# Patient Record
Sex: MALE | Race: WHITE | Employment: OTHER
[De-identification: names, ages, dates, MRNs, and addresses within clinical notes are randomized per-mention and may not be internally consistent; named-entity substitution may affect disease eponyms.]

---

## 2017-01-04 ENCOUNTER — OFFICE VISIT (OUTPATIENT)
Dept: NEUROLOGY | Facility: CLINIC | Age: 67
End: 2017-01-04

## 2017-01-04 VITALS
HEART RATE: 72 BPM | HEIGHT: 67 IN | SYSTOLIC BLOOD PRESSURE: 152 MMHG | DIASTOLIC BLOOD PRESSURE: 86 MMHG | BODY MASS INDEX: 31.39 KG/M2 | WEIGHT: 200 LBS

## 2017-01-04 DIAGNOSIS — D32.9 MENINGIOMA (HCC): ICD-10-CM

## 2017-01-04 DIAGNOSIS — G93.9 TEMPORAL LOBE LESION: ICD-10-CM

## 2017-01-04 DIAGNOSIS — G40.909 SEIZURE DISORDER (HCC): Primary | ICD-10-CM

## 2017-01-04 PROCEDURE — 99214 OFFICE O/P EST MOD 30 MIN: CPT | Performed by: PSYCHIATRY & NEUROLOGY

## 2017-01-10 ENCOUNTER — TELEPHONE (OUTPATIENT)
Dept: NEUROLOGY | Facility: CLINIC | Age: 67
End: 2017-01-10

## 2017-01-10 DIAGNOSIS — G40.909 SEIZURE DISORDER (HCC): ICD-10-CM

## 2017-01-10 DIAGNOSIS — G40.909 SEIZURE DISORDER (HCC): Primary | ICD-10-CM

## 2017-01-23 DIAGNOSIS — G40.909 SEIZURE DISORDER (HCC): ICD-10-CM

## 2017-01-24 ENCOUNTER — TELEPHONE (OUTPATIENT)
Dept: NEUROLOGY | Facility: CLINIC | Age: 67
End: 2017-01-24

## 2017-01-24 DIAGNOSIS — G40.909 SEIZURE DISORDER (HCC): Primary | ICD-10-CM

## 2017-02-02 DIAGNOSIS — G40.909 SEIZURE DISORDER (HCC): ICD-10-CM

## 2017-02-10 ENCOUNTER — TELEPHONE (OUTPATIENT)
Dept: NEUROLOGY | Facility: CLINIC | Age: 67
End: 2017-02-10

## 2017-02-10 DIAGNOSIS — G40.909 SEIZURE DISORDER (HCC): Primary | ICD-10-CM

## 2017-02-23 DIAGNOSIS — G40.909 SEIZURE DISORDER (HCC): ICD-10-CM

## 2017-03-30 ENCOUNTER — TELEPHONE (OUTPATIENT)
Dept: NEUROLOGY | Age: 67
End: 2017-03-30

## 2017-04-11 ENCOUNTER — TELEPHONE (OUTPATIENT)
Dept: NEUROLOGY | Age: 67
End: 2017-04-11

## 2017-05-02 RX ORDER — PHENYTOIN SODIUM 100 MG/1
CAPSULE, EXTENDED RELEASE ORAL
Qty: 150 CAPSULE | Refills: 4 | Status: SHIPPED | OUTPATIENT
Start: 2017-05-02 | End: 2017-07-07 | Stop reason: SDUPTHER

## 2017-05-02 RX ORDER — PHENYTOIN SODIUM 100 MG/1
200 CAPSULE, EXTENDED RELEASE ORAL 2 TIMES DAILY
Qty: 120 CAPSULE | Refills: 1 | Status: SHIPPED | OUTPATIENT
Start: 2017-05-02 | End: 2017-05-02 | Stop reason: SDUPTHER

## 2017-05-23 ENCOUNTER — TELEPHONE (OUTPATIENT)
Dept: NEUROLOGY | Age: 67
End: 2017-05-23

## 2017-05-23 DIAGNOSIS — G93.9 TEMPORAL LOBE LESION: Primary | ICD-10-CM

## 2017-05-23 RX ORDER — ALPRAZOLAM 0.5 MG/1
TABLET ORAL
Qty: 2 TABLET | Refills: 0 | OUTPATIENT
Start: 2017-05-23 | End: 2017-06-08 | Stop reason: SDUPTHER

## 2017-05-26 DIAGNOSIS — R56.9 SEIZURE (HCC): Primary | ICD-10-CM

## 2017-06-08 ENCOUNTER — TELEPHONE (OUTPATIENT)
Dept: NEUROLOGY | Age: 67
End: 2017-06-08

## 2017-06-08 ENCOUNTER — HOSPITAL ENCOUNTER (OUTPATIENT)
Dept: MRI IMAGING | Age: 67
Discharge: HOME | End: 2017-06-08
Payer: MEDICARE

## 2017-06-08 ENCOUNTER — HOSPITAL ENCOUNTER (OUTPATIENT)
Age: 67
Discharge: HOME | End: 2017-06-08
Payer: MEDICARE

## 2017-06-08 DIAGNOSIS — G93.9 TEMPORAL LOBE LESION: ICD-10-CM

## 2017-06-08 DIAGNOSIS — R56.9 SEIZURE (HCC): ICD-10-CM

## 2017-06-08 LAB
BUN BLDV-MCNC: 20 MG/DL (ref 8–23)
CREAT SERPL-MCNC: 0.71 MG/DL (ref 0.7–1.2)
GFR AFRICAN AMERICAN: >60 ML/MIN
GFR NON-AFRICAN AMERICAN: >60 ML/MIN
GFR SERPL CREATININE-BSD FRML MDRD: NORMAL ML/MIN/{1.73_M2}
GFR SERPL CREATININE-BSD FRML MDRD: NORMAL ML/MIN/{1.73_M2}

## 2017-06-08 PROCEDURE — 36415 COLL VENOUS BLD VENIPUNCTURE: CPT

## 2017-06-08 PROCEDURE — 84520 ASSAY OF UREA NITROGEN: CPT

## 2017-06-08 PROCEDURE — 82565 ASSAY OF CREATININE: CPT

## 2017-06-08 RX ORDER — ALPRAZOLAM 0.5 MG/1
TABLET ORAL
Qty: 2 TABLET | Refills: 0 | OUTPATIENT
Start: 2017-06-08 | End: 2017-12-26 | Stop reason: SDUPTHER

## 2017-06-13 ENCOUNTER — HOSPITAL ENCOUNTER (OUTPATIENT)
Dept: MRI IMAGING | Age: 67
Discharge: HOME | End: 2017-06-13
Payer: MEDICARE

## 2017-06-13 DIAGNOSIS — G93.9 TEMPORAL LOBE LESION: ICD-10-CM

## 2017-06-13 PROCEDURE — A9579 GAD-BASE MR CONTRAST NOS,1ML: HCPCS | Performed by: PSYCHIATRY & NEUROLOGY

## 2017-06-13 PROCEDURE — 70553 MRI BRAIN STEM W/O & W/DYE: CPT

## 2017-06-13 PROCEDURE — 6360000004 HC RX CONTRAST MEDICATION: Performed by: PSYCHIATRY & NEUROLOGY

## 2017-06-13 PROCEDURE — 2580000003 HC RX 258: Performed by: PSYCHIATRY & NEUROLOGY

## 2017-06-13 RX ORDER — SODIUM CHLORIDE 0.9 % (FLUSH) 0.9 %
10 SYRINGE (ML) INJECTION PRN
Status: DISCONTINUED | OUTPATIENT
Start: 2017-06-13 | End: 2017-06-16 | Stop reason: HOSPADM

## 2017-06-13 RX ADMIN — Medication 10 ML: at 07:27

## 2017-06-13 RX ADMIN — GADOPENTETATE DIMEGLUMINE 19 ML: 469.01 INJECTION INTRAVENOUS at 07:27

## 2017-06-29 RX ORDER — TRAZODONE HYDROCHLORIDE 50 MG/1
TABLET ORAL
Qty: 60 TABLET | Refills: 0 | Status: SHIPPED | OUTPATIENT
Start: 2017-06-29 | End: 2017-07-29

## 2017-07-07 RX ORDER — PHENYTOIN SODIUM 100 MG/1
CAPSULE, EXTENDED RELEASE ORAL
Qty: 150 CAPSULE | Refills: 4 | OUTPATIENT
Start: 2017-07-07 | End: 2017-08-16 | Stop reason: SDUPTHER

## 2017-08-16 ENCOUNTER — OFFICE VISIT (OUTPATIENT)
Dept: NEUROLOGY | Age: 67
End: 2017-08-16
Payer: MEDICARE

## 2017-08-16 VITALS
BODY MASS INDEX: 30.13 KG/M2 | DIASTOLIC BLOOD PRESSURE: 86 MMHG | HEART RATE: 70 BPM | SYSTOLIC BLOOD PRESSURE: 124 MMHG | HEIGHT: 67 IN | WEIGHT: 192 LBS

## 2017-08-16 DIAGNOSIS — G93.9 TEMPORAL LOBE LESION: ICD-10-CM

## 2017-08-16 DIAGNOSIS — R56.9 SEIZURE (HCC): Primary | ICD-10-CM

## 2017-08-16 PROCEDURE — G8417 CALC BMI ABV UP PARAM F/U: HCPCS | Performed by: PSYCHIATRY & NEUROLOGY

## 2017-08-16 PROCEDURE — 3017F COLORECTAL CA SCREEN DOC REV: CPT | Performed by: PSYCHIATRY & NEUROLOGY

## 2017-08-16 PROCEDURE — 1123F ACP DISCUSS/DSCN MKR DOCD: CPT | Performed by: PSYCHIATRY & NEUROLOGY

## 2017-08-16 PROCEDURE — 4040F PNEUMOC VAC/ADMIN/RCVD: CPT | Performed by: PSYCHIATRY & NEUROLOGY

## 2017-08-16 PROCEDURE — G8427 DOCREV CUR MEDS BY ELIG CLIN: HCPCS | Performed by: PSYCHIATRY & NEUROLOGY

## 2017-08-16 PROCEDURE — 1036F TOBACCO NON-USER: CPT | Performed by: PSYCHIATRY & NEUROLOGY

## 2017-08-16 PROCEDURE — 99214 OFFICE O/P EST MOD 30 MIN: CPT | Performed by: PSYCHIATRY & NEUROLOGY

## 2017-08-16 RX ORDER — PHENYTOIN SODIUM 100 MG/1
CAPSULE, EXTENDED RELEASE ORAL
Qty: 120 CAPSULE | Refills: 11 | Status: SHIPPED | OUTPATIENT
Start: 2017-08-16 | End: 2017-10-06 | Stop reason: SDUPTHER

## 2017-08-18 ENCOUNTER — TELEPHONE (OUTPATIENT)
Dept: NEUROLOGY | Age: 67
End: 2017-08-18

## 2017-08-22 DIAGNOSIS — R56.9 SEIZURE (HCC): ICD-10-CM

## 2017-10-06 RX ORDER — PHENYTOIN SODIUM 100 MG/1
CAPSULE, EXTENDED RELEASE ORAL
Qty: 120 CAPSULE | Refills: 10 | OUTPATIENT
Start: 2017-10-06 | End: 2018-09-04 | Stop reason: SDUPTHER

## 2017-12-14 ENCOUNTER — TELEPHONE (OUTPATIENT)
Dept: NEUROLOGY | Age: 67
End: 2017-12-14

## 2017-12-15 ENCOUNTER — HOSPITAL ENCOUNTER (EMERGENCY)
Age: 67
Discharge: HOME | End: 2017-12-15
Attending: EMERGENCY MEDICINE
Payer: MEDICARE

## 2017-12-15 ENCOUNTER — APPOINTMENT (OUTPATIENT)
Dept: GENERAL RADIOLOGY | Age: 67
End: 2017-12-15
Payer: MEDICARE

## 2017-12-15 VITALS
SYSTOLIC BLOOD PRESSURE: 122 MMHG | HEART RATE: 85 BPM | HEIGHT: 67 IN | RESPIRATION RATE: 18 BRPM | DIASTOLIC BLOOD PRESSURE: 78 MMHG | TEMPERATURE: 97.5 F | BODY MASS INDEX: 29.03 KG/M2 | OXYGEN SATURATION: 98 % | WEIGHT: 185 LBS

## 2017-12-15 DIAGNOSIS — M25.511 RIGHT SHOULDER PAIN, UNSPECIFIED CHRONICITY: ICD-10-CM

## 2017-12-15 DIAGNOSIS — R56.9 SEIZURE (HCC): Primary | ICD-10-CM

## 2017-12-15 LAB
ABSOLUTE EOS #: 0.05 K/UL (ref 0–0.44)
ABSOLUTE IMMATURE GRANULOCYTE: 0.03 K/UL (ref 0–0.3)
ABSOLUTE LYMPH #: 0.92 K/UL (ref 1.1–3.7)
ABSOLUTE MONO #: 0.7 K/UL (ref 0.1–1.2)
ANION GAP SERPL CALCULATED.3IONS-SCNC: 16 MMOL/L (ref 9–17)
BASOPHILS # BLD: 0 % (ref 0–2)
BASOPHILS ABSOLUTE: <0.03 K/UL (ref 0–0.2)
BUN BLDV-MCNC: 22 MG/DL (ref 8–23)
BUN/CREAT BLD: ABNORMAL (ref 9–20)
CALCIUM SERPL-MCNC: 9.7 MG/DL (ref 8.6–10.4)
CHLORIDE BLD-SCNC: 104 MMOL/L (ref 98–107)
CO2: 23 MMOL/L (ref 20–31)
CREAT SERPL-MCNC: 0.66 MG/DL (ref 0.7–1.2)
DIFFERENTIAL TYPE: ABNORMAL
EOSINOPHILS RELATIVE PERCENT: 1 % (ref 1–4)
GFR AFRICAN AMERICAN: >60 ML/MIN
GFR NON-AFRICAN AMERICAN: >60 ML/MIN
GFR SERPL CREATININE-BSD FRML MDRD: ABNORMAL ML/MIN/{1.73_M2}
GFR SERPL CREATININE-BSD FRML MDRD: ABNORMAL ML/MIN/{1.73_M2}
GLUCOSE BLD-MCNC: 155 MG/DL (ref 70–99)
HCT VFR BLD CALC: 43.1 % (ref 40.7–50.3)
HEMOGLOBIN: 14.8 G/DL (ref 13–17)
IMMATURE GRANULOCYTES: 0 %
LYMPHOCYTES # BLD: 14 % (ref 24–43)
MCH RBC QN AUTO: 30.6 PG (ref 25.2–33.5)
MCHC RBC AUTO-ENTMCNC: 34.3 G/DL (ref 28.4–34.8)
MCV RBC AUTO: 89.2 FL (ref 82.6–102.9)
MONOCYTES # BLD: 11 % (ref 3–12)
PDW BLD-RTO: 12.1 % (ref 11.8–14.4)
PHENYTOIN DATE LAST DOSE: ABNORMAL
PHENYTOIN DOSE AMOUNT: ABNORMAL
PHENYTOIN DOSE TIME: ABNORMAL
PHENYTOIN LEVEL: 8.2 UG/ML (ref 10–20)
PLATELET # BLD: 254 K/UL (ref 138–453)
PLATELET ESTIMATE: ABNORMAL
PMV BLD AUTO: 9.7 FL (ref 8.1–13.5)
POTASSIUM SERPL-SCNC: 4.2 MMOL/L (ref 3.7–5.3)
RBC # BLD: 4.83 M/UL (ref 4.21–5.77)
RBC # BLD: ABNORMAL 10*6/UL
SEG NEUTROPHILS: 74 % (ref 36–65)
SEGMENTED NEUTROPHILS ABSOLUTE COUNT: 4.96 K/UL (ref 1.5–8.1)
SODIUM BLD-SCNC: 143 MMOL/L (ref 135–144)
WBC # BLD: 6.7 K/UL (ref 3.5–11.3)
WBC # BLD: ABNORMAL 10*3/UL

## 2017-12-15 PROCEDURE — 73030 X-RAY EXAM OF SHOULDER: CPT

## 2017-12-15 PROCEDURE — 80185 ASSAY OF PHENYTOIN TOTAL: CPT

## 2017-12-15 PROCEDURE — 85025 COMPLETE CBC W/AUTO DIFF WBC: CPT

## 2017-12-15 PROCEDURE — 99284 EMERGENCY DEPT VISIT MOD MDM: CPT

## 2017-12-15 PROCEDURE — 80048 BASIC METABOLIC PNL TOTAL CA: CPT

## 2017-12-15 PROCEDURE — 6370000000 HC RX 637 (ALT 250 FOR IP): Performed by: EMERGENCY MEDICINE

## 2017-12-15 RX ORDER — PHENYTOIN SODIUM 100 MG/1
400 CAPSULE, EXTENDED RELEASE ORAL ONCE
Status: COMPLETED | OUTPATIENT
Start: 2017-12-15 | End: 2017-12-15

## 2017-12-15 RX ADMIN — EXTENDED PHENYTOIN SODIUM 400 MG: 100 CAPSULE ORAL at 12:15

## 2017-12-15 NOTE — TELEPHONE ENCOUNTER
Heber Dorys Mcintosh went to the ER this morning. He was evaluated and released. They did do some lab work and the Phenytoin level was low at 8.2. This was discussed with Dr. Janes Valverde. He would like to add Keppra 500 mg BID and keep the Dilantin 200 mg BID. Dr. Janes Valverde also restricted Mr. Dorys Mcintosh from driving for 3 months. Call was placed back to Mr. Dorys Mcintosh this afternoon and this was discussed. Patient wrote down the name of the new Rx. When I told him that he was restricted from driving for 3 months he stated \"3 months is a long time\". I asked him to call if there were any problems.

## 2017-12-15 NOTE — ED PROVIDER NOTES
[facility]  ED  Emergency Department Encounter  Emergency Medicine Resident     Pt Name: Maria Alejandra Flores  MRN: [id number]  [place] 1950  Date of evaluation: 12/15/17  PCP:  Matt Cates       Chief Complaint   Patient presents with    Shoulder Pain     Pt to ED with c/o possible seizure on Tuesday, pt woke up wednesday with sever bruising to right shoulder and unsure how he got it, pt also bit the side of his tongue with no memory of it, pt has hx of seizures, takes dilantin, missed 1 dose this week    Seizures       HISTORY OF PRESENT ILLNESS  (Location/Symptom, Timing/Onset, Context/Setting, Quality, Duration, Modifying Factors, Severity.)      Maria Alejandra Flores is a 79 y.o. male who has a known seizure disorder on Fosphenytoin presents acutely after a seizure like episode. Not witnessed. Stated that it was generalized tonic clonic  And it was unwitnessed and it took about twenty minutes to regain full consciousness. + loss of bowel or bladder function and biting tongue. Was compliant with his medications but missed two doses  medications . Neurologist is Neri Alba which he has temporal lobe lesions. Mild generalized headache that is dull in nature consistent with normal post seizure headaches on wednesday but denies any trauma to the extremities except to the right shoulder of which he complains of pain. Symptoms are moderate to severe in severity no other aggravating or relieving factors and improving in nature. Denied any fevers, chills, change in urination, change in stools, productive cough, nausea, emesis, diarrhea, drug abuse or alcohol abuse, blood thinnersexcept is on clopidogrel/asa. No changes in hearing changes or tinnitus, blurry or double vision. PAST MEDICAL / SURGICAL / SOCIAL / FAMILY HISTORY      has a past medical history of Cataracts, bilateral; Coronary artery disease; Heart attack; Hyperlipidemia; Hypertension; and Seizures ([facility] [address]      has a past Insert peripheral IV       MEDICATIONS ORDERED:  Orders Placed This Encounter   Medications    DISCONTD: phenytoin (DILANTIN) 420 mg in sodium chloride 0.9 % 100 mL IVPB (loading dose)    phenytoin (DILANTIN) ER capsule 400 mg       DDX:  Primary, cva, avm, hypertensive encephalopathy, intraparenchymal hemorrhage, sah, sdh, edh, meningitis, ecncepahlitis, brain abscess, hiv encephalopathy, febrile, neurosyphilis, etoh/sedative withdrawal, ingestion (cocaine,  Pcp, amphetamines, tca, lidocaine, inh), change in glucose, change in sodium, hypocalcemia, hypomagnesemia, hypoxia, uremia, hepatic failure, thyrotoxicosis, Reye, cmv,      DIAGNOSTIC RESULTS / EMERGENCY DEPARTMENT COURSE / MDM     LABS:  Results for orders placed or performed during the hospital encounter of 12/15/17   CBC WITH AUTO DIFFERENTIAL   Result Value Ref Range    WBC 6.7 3.5 - 11.3 k/uL    RBC 4.83 4.21 - 5.77 m/uL    Hemoglobin 14.8 13.0 - 17.0 g/dL    Hematocrit 43.1 40.7 - 50.3 %    MCV 89.2 82.6 - 102.9 fL    MCH 30.6 25.2 - 33.5 pg    MCHC 34.3 28.4 - 34.8 g/dL    RDW 12.1 11.8 - 14.4 %    Platelets 857 013 - 826 k/uL    MPV 9.7 8.1 - 13.5 fL    Differential Type NOT REPORTED     Seg Neutrophils 74 (H) 36 - 65 %    Lymphocytes 14 (L) 24 - 43 %    Monocytes 11 3 - 12 %    Eosinophils % 1 1 - 4 %    Basophils 0 0 - 2 %    Immature Granulocytes 0 0 %    Segs Absolute 4.96 1.50 - 8.10 k/uL    Absolute Lymph # 0.92 (L) 1.10 - 3.70 k/uL    Absolute Mono # 0.70 0.10 - 1.20 k/uL    Absolute Eos # 0.05 0.00 - 0.44 k/uL    Basophils # <0.03 0.00 - 0.20 k/uL    Absolute Immature Granulocyte 0.03 0.00 - 0.30 k/uL    WBC Morphology NOT REPORTED     RBC Morphology NOT REPORTED     Platelet Estimate NOT REPORTED    BASIC METABOLIC PANEL   Result Value Ref Range    Glucose 155 (H) 70 - 99 mg/dL    BUN 22 8 - 23 mg/dL    CREATININE 0.66 (L) 0.70 - 1.20 mg/dL    Bun/Cre Ratio NOT REPORTED 9 - 20    Calcium 9.7 8.6 - 10.4 mg/dL    Sodium 143 135 - 144 mmol/L Potassium 4.2 3.7 - 5.3 mmol/L    Chloride 104 98 - 107 mmol/L    CO2 23 20 - 31 mmol/L    Anion Gap 16 9 - 17 mmol/L    GFR Non-African American >60 >60 mL/min    GFR African American >60 >60 mL/min    GFR Comment          GFR Staging NOT REPORTED    PHENYTOIN LEVEL, TOTAL   Result Value Ref Range    Phenytoin Lvl 8.2 (L) 10.0 - 20.0 ug/mL    Phenytoin Dose Amount NOT REPORTED     Phenytoin Date Last Dose NOT REPORTED     Phenytoin Dose Time NOT REPORTED        IMPRESSION: Patient afebrile, non hypertensive,  neurologically intact atraumatic appearing with a known seizure disorder, presenting with symptoms typical of normal seizure. Plan is seizure precautions, seizure medications, reassess and follow up with neurologist I have low clinical suspicion for a EPH/SDH/Intracranial Mass, Shoulder Dislocation, Hypertensive Encephalopathy, Withdrawal or Electrolyte abnormalities at this time    RADIOLOGY:  Xr Shoulder Right (min 2 Views)    Result Date: 12/15/2017  EXAMINATION: 3 VIEWS OF THE RIGHT SHOULDER 12/15/2017 9:44 am COMPARISON: None. HISTORY: ORDERING SYSTEM PROVIDED HISTORY: shoulder pain post seixrue TECHNOLOGIST PROVIDED HISTORY: Reason for exam:->shoulder pain post seixrue FINDINGS: There is a high-riding humeral head with marked decreased acromiohumeral distance suggestive of prior rotator cuff injury. No definite fracture. No dislocation. The acromioclavicular joint appears intact. No acute osseous abnormality. High-riding humeral head indicating prior rotator cuff injury. EKG  None     All EKG's are interpreted by the Emergency Department Physician who either signs or Co-signs this chart in the absence of a cardiologist.    EMERGENCY DEPARTMENT COURSE:  I gave him a dose of 5 mg/kg of his Dilantin given that he is barely subtherapeutic.   I called the neurologist try to get him an earlier since he has an appointment or his spouse scheduled appointment February after his MRI due to his possible temporal lobe involvement that is a potential cause of his seizures. Patient hasn't had a seizure last couple of years  Do not drive, swim unattended and stand up taking shower until cleared by your doctor or neurologist.  Follow up pcp/neurology  PROCEDURES:  None    CONSULTS:  IP CONSULT TO NEUROLOGY  IP CONSULT TO NEUROLOGY    CRITICAL CARE:  None    FINAL IMPRESSION      1. Seizure ([address]    2.  Right shoulder pain, unspecified chronicity          DISPOSITION / PLAN     DISPOSITION Decision To Discharge    PATIENT REFERRED TO:  Da Montiel  [address] ED  [address] [place] [address]  [phone]    If symptoms worsen    Tad Bennett MD  [address]  [phone]      Call him for an appointent after you go to your mri      DISCHARGE MEDICATIONS:  Discharge Medication List as of 12/15/2017 12:08 PM          Jag Floyd MD  Emergency Medicine Resident    (Please note that portions of this note were completed with a voice recognition program.  Efforts were made to edit the dictations but occasionally words are mis-transcribed.)       Jag Floyd MD  Resident  12/16/17 [id number]

## 2017-12-16 DIAGNOSIS — R56.9 SEIZURE (HCC): Primary | ICD-10-CM

## 2017-12-16 RX ORDER — LEVETIRACETAM 500 MG/1
500 TABLET ORAL 2 TIMES DAILY
Qty: 60 TABLET | Refills: 3 | Status: SHIPPED | OUTPATIENT
Start: 2017-12-16 | End: 2018-02-23 | Stop reason: SDUPTHER

## 2017-12-18 RX ORDER — ALPRAZOLAM 0.5 MG/1
TABLET ORAL
Qty: 2 TABLET | Refills: 0 | OUTPATIENT
Start: 2017-12-18 | End: 2018-09-12 | Stop reason: ALTCHOICE

## 2017-12-18 NOTE — TELEPHONE ENCOUNTER
Pt's daughter called in stating he is scheduled for his MRI on 12/28. She is requesting he have something to help calm him for the MRI. I told her that I would talk with you and see if we could call something in. She also asked about getting him in for an appt as he was seen in the ER on Friday for a seizure. I told her that it would need to be after the MRI. His appt in Feb needed to be RS anyway so I moved his appt up to 1/4 at 2 pm, our first opening. She stated that with him starting on a new medication he felt he should be seen as well.

## 2017-12-19 ENCOUNTER — TELEPHONE (OUTPATIENT)
Dept: NEUROLOGY | Age: 67
End: 2017-12-19

## 2017-12-19 NOTE — TELEPHONE ENCOUNTER
Pt called in wanting to clarify if he should be taking the Keppra and Dilantin together. I told him yes, he should be taking both twice a day. He then asked if while taking both can he drive. I told him no, he can not drive for 3 months because he had a seizure. He said that's what he was afraid of. I also confirmed with him his appt in Jan as I had made that appt with his daughter. He stated his understanding.

## 2017-12-26 ENCOUNTER — TELEPHONE (OUTPATIENT)
Dept: NEUROLOGY | Age: 67
End: 2017-12-26

## 2017-12-26 NOTE — TELEPHONE ENCOUNTER
Pt daughter Robbi Cullen called in today he ssaid [facility] in [facility] said they did not have the Xanax RX that was phoned in on 12/18 for his MRI. I called and spoke with Ermelinda Cooley at [facility]. She did not see any record of an RX. I gave it to her verbally today. I called his daughter Robbi Cullen with this message.

## 2017-12-28 ENCOUNTER — HOSPITAL ENCOUNTER (OUTPATIENT)
Dept: MRI IMAGING | Age: 67
Discharge: HOME | End: 2017-12-28
Payer: MEDICARE

## 2017-12-28 DIAGNOSIS — G93.9 TEMPORAL LOBE LESION: ICD-10-CM

## 2017-12-28 LAB
BUN BLDV-MCNC: 16 MG/DL (ref 8–23)
CREAT SERPL-MCNC: 0.65 MG/DL (ref 0.7–1.2)
GFR AFRICAN AMERICAN: >60 ML/MIN
GFR NON-AFRICAN AMERICAN: >60 ML/MIN
GFR SERPL CREATININE-BSD FRML MDRD: ABNORMAL ML/MIN/{1.73_M2}
GFR SERPL CREATININE-BSD FRML MDRD: ABNORMAL ML/MIN/{1.73_M2}

## 2017-12-28 PROCEDURE — 70553 MRI BRAIN STEM W/O & W/DYE: CPT

## 2017-12-28 PROCEDURE — 82565 ASSAY OF CREATININE: CPT

## 2017-12-28 PROCEDURE — 6360000004 HC RX CONTRAST MEDICATION: Performed by: PSYCHIATRY & NEUROLOGY

## 2017-12-28 PROCEDURE — A9579 GAD-BASE MR CONTRAST NOS,1ML: HCPCS | Performed by: PSYCHIATRY & NEUROLOGY

## 2017-12-28 PROCEDURE — 84520 ASSAY OF UREA NITROGEN: CPT

## 2017-12-28 PROCEDURE — 36415 COLL VENOUS BLD VENIPUNCTURE: CPT

## 2017-12-28 RX ORDER — SODIUM CHLORIDE 0.9 % (FLUSH) 0.9 %
10 SYRINGE (ML) INJECTION PRN
Status: DISCONTINUED | OUTPATIENT
Start: 2017-12-28 | End: 2017-12-31 | Stop reason: HOSPADM

## 2017-12-28 RX ADMIN — GADOTERIDOL 19 ML: 279.3 INJECTION, SOLUTION INTRAVENOUS at 11:01

## 2018-01-02 ENCOUNTER — OFFICE VISIT (OUTPATIENT)
Dept: NEUROLOGY | Age: 68
End: 2018-01-02
Payer: MEDICARE

## 2018-01-02 VITALS
DIASTOLIC BLOOD PRESSURE: 73 MMHG | HEART RATE: 69 BPM | WEIGHT: 203.2 LBS | BODY MASS INDEX: 31.89 KG/M2 | SYSTOLIC BLOOD PRESSURE: 133 MMHG | HEIGHT: 67 IN

## 2018-01-02 DIAGNOSIS — G40.909 SEIZURE DISORDER (HCC): Primary | ICD-10-CM

## 2018-01-02 DIAGNOSIS — G93.9 TEMPORAL LOBE LESION: ICD-10-CM

## 2018-01-02 PROCEDURE — 99214 OFFICE O/P EST MOD 30 MIN: CPT | Performed by: PSYCHIATRY & NEUROLOGY

## 2018-01-02 PROCEDURE — 1123F ACP DISCUSS/DSCN MKR DOCD: CPT | Performed by: PSYCHIATRY & NEUROLOGY

## 2018-01-02 PROCEDURE — G8417 CALC BMI ABV UP PARAM F/U: HCPCS | Performed by: PSYCHIATRY & NEUROLOGY

## 2018-01-02 PROCEDURE — G8427 DOCREV CUR MEDS BY ELIG CLIN: HCPCS | Performed by: PSYCHIATRY & NEUROLOGY

## 2018-01-02 PROCEDURE — 4040F PNEUMOC VAC/ADMIN/RCVD: CPT | Performed by: PSYCHIATRY & NEUROLOGY

## 2018-01-02 PROCEDURE — 1036F TOBACCO NON-USER: CPT | Performed by: PSYCHIATRY & NEUROLOGY

## 2018-01-02 PROCEDURE — G8484 FLU IMMUNIZE NO ADMIN: HCPCS | Performed by: PSYCHIATRY & NEUROLOGY

## 2018-01-02 PROCEDURE — 3017F COLORECTAL CA SCREEN DOC REV: CPT | Performed by: PSYCHIATRY & NEUROLOGY

## 2018-01-02 ASSESSMENT — ENCOUNTER SYMPTOMS
ALLERGIC/IMMUNOLOGIC NEGATIVE: 1
EYES NEGATIVE: 1
GASTROINTESTINAL NEGATIVE: 1

## 2018-01-02 NOTE — PROGRESS NOTES
Subjective:      Patient ID: Kamala Walter is a 79 y.o. male. 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 There is right parietal meningioma . The condition is there has been one seizure since last seen in August calling his daughter being bruised up in shoulder along with having bitten tongue in mid December having missed one to two dilantin doses with keppra being added to dilantin tolerating this well at 500 mg po bid . He is not driving since seizure in mid December . There have been mild memory complaints . There is no focal motor ,sensory or bulbar complaints . Significant medications dilantin 200 mg po bid, keppra 500 mg po bid   , lipitor 80 mg po qd., aspirin 81 mg po qd  Testing MRI of Head noncontrast with inflammation 1.4 x 1.3 x .1.1 cm posterior left hippocampus and right fontal lobety along with right parietal 2 x 0.7 x 2.3 cm meningioma, March 2016 . CTA head and neck with no large vessel occlusion . CSF analysis 0 RBC, 0 WBC, total protein 66 , glucose 68 . EEG normal , June 2016 . CSF analysis 0 RBC, 0 WBC, total protein 66 , glucose 68. FU MRI less conspicuous left temporal lesion with right frontal convexity extraxial meningioma  2.4 x 1 cm with enhancement , June 2016 . FU MRI of Head stable appearing T2 hyperintense ill defined nonenhancing lesion in the left mesial temporal lobe with associated FLAIR and T2 signal hyperintensity involving the left hippocampus, December 2016. Total dilantin 21.1 ,Free dilantin 2.1 , August 2017 . MRI of Head with stable cystic appearance mass in left temporal lobe with surrounding edema . 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  Stable right frontal meningioma 2.8 x 1.7 cm with mild chronic periventricular small vessel ischemia and small righ

## 2018-02-23 ENCOUNTER — OFFICE VISIT (OUTPATIENT)
Dept: NEUROLOGY | Age: 68
End: 2018-02-23
Payer: MEDICARE

## 2018-02-23 VITALS
SYSTOLIC BLOOD PRESSURE: 131 MMHG | WEIGHT: 208.2 LBS | BODY MASS INDEX: 32.68 KG/M2 | HEART RATE: 63 BPM | HEIGHT: 67 IN | DIASTOLIC BLOOD PRESSURE: 80 MMHG

## 2018-02-23 DIAGNOSIS — G40.909 SEIZURE DISORDER (HCC): Primary | ICD-10-CM

## 2018-02-23 DIAGNOSIS — G93.9 TEMPORAL LOBE LESION: ICD-10-CM

## 2018-02-23 DIAGNOSIS — R56.9 SEIZURE (HCC): ICD-10-CM

## 2018-02-23 PROCEDURE — 3017F COLORECTAL CA SCREEN DOC REV: CPT | Performed by: PSYCHIATRY & NEUROLOGY

## 2018-02-23 PROCEDURE — G8484 FLU IMMUNIZE NO ADMIN: HCPCS | Performed by: PSYCHIATRY & NEUROLOGY

## 2018-02-23 PROCEDURE — 1036F TOBACCO NON-USER: CPT | Performed by: PSYCHIATRY & NEUROLOGY

## 2018-02-23 PROCEDURE — G8427 DOCREV CUR MEDS BY ELIG CLIN: HCPCS | Performed by: PSYCHIATRY & NEUROLOGY

## 2018-02-23 PROCEDURE — 4040F PNEUMOC VAC/ADMIN/RCVD: CPT | Performed by: PSYCHIATRY & NEUROLOGY

## 2018-02-23 PROCEDURE — 99214 OFFICE O/P EST MOD 30 MIN: CPT | Performed by: PSYCHIATRY & NEUROLOGY

## 2018-02-23 PROCEDURE — 1123F ACP DISCUSS/DSCN MKR DOCD: CPT | Performed by: PSYCHIATRY & NEUROLOGY

## 2018-02-23 PROCEDURE — G8417 CALC BMI ABV UP PARAM F/U: HCPCS | Performed by: PSYCHIATRY & NEUROLOGY

## 2018-02-23 RX ORDER — LEVETIRACETAM 500 MG/1
500 TABLET ORAL 2 TIMES DAILY
Qty: 60 TABLET | Refills: 11 | Status: SHIPPED | OUTPATIENT
Start: 2018-02-23 | End: 2018-09-12 | Stop reason: DRUGHIGH

## 2018-02-23 ASSESSMENT — ENCOUNTER SYMPTOMS
GASTROINTESTINAL NEGATIVE: 1
ALLERGIC/IMMUNOLOGIC NEGATIVE: 1
EYES NEGATIVE: 1

## 2018-02-23 NOTE — PROGRESS NOTES
lobe with surrounding edema . 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 Stable right frontal meningioma 2.8 x 1.7 cm with mild chronic periventricular small vessel ischemia and small righ frontal venous angioma , December 2017. Total dilantin 8.2 , December 2017 . Xray right shoulder High-riding humeral head indicating prior rotator cuff injury, December 2017     Past Medical History:   Diagnosis Date    Cataracts, bilateral     Coronary artery disease     Heart attack     Hyperlipidemia     Hypertension     Seizures ([address]        Past Surgical History:   Procedure Laterality Date    CORONARY ANGIOPLASTY WITH STENT PLACEMENT  2014    2 stents placed    JOINT REPLACEMENT Right 04/26/2017    ROTATOR CUFF REPAIR         Family History   Problem Relation Age of Onset    Stroke Mother     Stroke Father        Social History     Social History    Marital status: Single     Spouse name: N/A    Number of children: N/A    Years of education: N/A     Social History Main Topics    Smoking status: Former Smoker     Quit date: 4/26/2017    Smokeless tobacco: Never Used    Alcohol use 0.0 oz/week      Comment: occassionally    Drug use: No    Sexual activity: Not Asked     Other Topics Concern    None     Social History Narrative    None       Current Outpatient Prescriptions   Medication Sig Dispense Refill    levETIRAcetam (KEPPRA) 500 MG tablet Take 1 tablet by mouth 2 times daily 60 tablet 11    ALPRAZolam (XANAX) 0.5 MG tablet Take 1 tab 30 mins prior MRI, may take another tab just before MRI prn. . 2 tablet 0    phenytoin (DILANTIN) 100 MG ER capsule Take 2 po bid 120 capsule 10    diphenhydrAMINE (BENADRYL) 25 MG tablet Take 25 mg by mouth daily as needed       aspirin EC 81 MG EC tablet Take 1 tablet by mouth daily 30 tablet 3    atorvastatin (LIPITOR) 80 MG tablet Take 80 mg by mouth daily      carvedilol (COREG) 12.5 MG tablet Take 12.5 mg by mouth 2 times daily (with meals)  lisinopril (PRINIVIL;ZESTRIL) 10 MG tablet Take 10 mg by mouth 2 times daily        No current facility-administered medications for this visit. No Known Allergies      Review of Systems   Constitutional: Negative. HENT: Positive for tinnitus. Eyes: Negative. Cardiovascular: Positive for leg swelling. Gastrointestinal: Negative. Endocrine: Negative. Genitourinary: Positive for frequency. Musculoskeletal: Negative. Allergic/Immunologic: Negative. Neurological: Positive for dizziness and seizures. Hematological: Negative. Psychiatric/Behavioral: Negative. Objective:   Physical Exam    Vitals:    02/23/18 0807   BP: 131/80   Pulse: 63     weight: 208 lb 3.2 oz (94.4 kg)    Neurological Examination  Constitutional .General exam well groomed   Ears /Nose/Throat: external ear . Normal exam  Neck and thyroid . Normal size  Respiratory . Breathsounds clear bilaterally  Cardiovascular: Auscultation of heart with regular rate and rhythm  Musculoskeletal. Muscle bulk and tone normal   Muscle strength 5/5 strength throughout   No dysmetria or dysdiadokinesis  No tremor   Normal fine motor  Orientation Alert and oriented x 3   Attention and concentraion normal   Short term memory normal   Language process and speech normal . No aphasia   Cranial nerve 2 normal acuety and visual fields  Cranial nerve 3, 4 and 6 . Extraocular muscles are intact . Pupils are equal and reactive   Cranial nerve 5 . Intact corneal reflex. Normal facial sensation  Cranial nerve 7 normal exam   Cranial nerve 8. Grossly intact hearing   Cranial nerve 9 and 10. Symmetric palate elevation   Cranial nerve 11 , 5 out of 5 strength   Cranial Nerve 12 midline tongue . No atrophy  Sensation . Normal pinprick and light touch   Deep Tendon Reflexes normal  Plantar response flexor bilaterally    Assessment:      1. Seizure disorder ([address]    2. Temporal lobe lesion    3.  Seizure [facility])    He is seizure free on current

## 2018-02-26 ENCOUNTER — TELEPHONE (OUTPATIENT)
Dept: NEUROLOGY | Age: 68
End: 2018-02-26

## 2018-02-26 NOTE — TELEPHONE ENCOUNTER
Mr. Lyssa Briggs called the office and left a message on my voicemail. He stated that he would be getting his other knee replaced in the near future. He asked that I return his call, he has a few questions. I placed a call to Mr. Lyssa Briggs this afternoon, but had to leave a message on his voicemail.

## 2018-02-27 NOTE — TELEPHONE ENCOUNTER
I was able to speak with Heber Drake Woo today. He wanted to make sure that we knew he would be having knee surgery on May 3rd. Patient asked if we needed to do something for this. I told him that when it got closer the surgeons office would probably send a release requesting neuro clearance. Patient states he will call back if there is anything further.

## 2018-03-02 NOTE — COMMUNICATION BODY
 lisinopril (PRINIVIL;ZESTRIL) 10 MG tablet Take 10 mg by mouth 2 times daily        No current facility-administered medications for this visit. No Known Allergies      Review of Systems   Constitutional: Negative. HENT: Positive for tinnitus. Eyes: Negative. Cardiovascular: Positive for leg swelling. Gastrointestinal: Negative. Endocrine: Negative. Genitourinary: Positive for frequency. Musculoskeletal: Negative. Allergic/Immunologic: Negative. Neurological: Positive for dizziness and seizures. Hematological: Negative. Psychiatric/Behavioral: Negative. Objective:   Physical Exam    Vitals:    02/23/18 0807   BP: 131/80   Pulse: 63     weight: 208 lb 3.2 oz (94.4 kg)    Neurological Examination  Constitutional .General exam well groomed   Ears /Nose/Throat: external ear . Normal exam  Neck and thyroid . Normal size  Respiratory . Breathsounds clear bilaterally  Cardiovascular: Auscultation of heart with regular rate and rhythm  Musculoskeletal. Muscle bulk and tone normal   Muscle strength 5/5 strength throughout   No dysmetria or dysdiadokinesis  No tremor   Normal fine motor  Orientation Alert and oriented x 3   Attention and concentraion normal   Short term memory normal   Language process and speech normal . No aphasia   Cranial nerve 2 normal acuety and visual fields  Cranial nerve 3, 4 and 6 . Extraocular muscles are intact . Pupils are equal and reactive   Cranial nerve 5 . Intact corneal reflex. Normal facial sensation  Cranial nerve 7 normal exam   Cranial nerve 8. Grossly intact hearing   Cranial nerve 9 and 10. Symmetric palate elevation   Cranial nerve 11 , 5 out of 5 strength   Cranial Nerve 12 midline tongue . No atrophy  Sensation . Normal pinprick and light touch   Deep Tendon Reflexes normal  Plantar response flexor bilaterally    Assessment:      1. Seizure disorder ([address]    2. Temporal lobe lesion    3.  Seizure [facility])    He is seizure free on current medication regimen and can resume driving with stable left tempora lesion on MRI     Plan:      As above

## 2018-03-14 RX ORDER — LEVETIRACETAM 500 MG/1
TABLET ORAL
Qty: 60 TABLET | Refills: 1 | OUTPATIENT
Start: 2018-03-14 | End: 2018-06-25 | Stop reason: ALTCHOICE

## 2018-04-05 ENCOUNTER — TELEPHONE (OUTPATIENT)
Dept: NEUROLOGY | Age: 68
End: 2018-04-05

## 2018-06-25 ENCOUNTER — OFFICE VISIT (OUTPATIENT)
Dept: NEUROLOGY | Age: 68
End: 2018-06-25
Payer: MEDICARE

## 2018-06-25 VITALS
HEIGHT: 67 IN | WEIGHT: 199 LBS | BODY MASS INDEX: 31.23 KG/M2 | HEART RATE: 64 BPM | DIASTOLIC BLOOD PRESSURE: 82 MMHG | SYSTOLIC BLOOD PRESSURE: 156 MMHG

## 2018-06-25 DIAGNOSIS — G40.909 SEIZURE DISORDER (HCC): Primary | ICD-10-CM

## 2018-06-25 DIAGNOSIS — D32.9 MENINGIOMA (HCC): ICD-10-CM

## 2018-06-25 DIAGNOSIS — G93.9 TEMPORAL LOBE LESION: ICD-10-CM

## 2018-06-25 PROCEDURE — 1036F TOBACCO NON-USER: CPT | Performed by: PSYCHIATRY & NEUROLOGY

## 2018-06-25 PROCEDURE — 1123F ACP DISCUSS/DSCN MKR DOCD: CPT | Performed by: PSYCHIATRY & NEUROLOGY

## 2018-06-25 PROCEDURE — 4040F PNEUMOC VAC/ADMIN/RCVD: CPT | Performed by: PSYCHIATRY & NEUROLOGY

## 2018-06-25 PROCEDURE — 99214 OFFICE O/P EST MOD 30 MIN: CPT | Performed by: PSYCHIATRY & NEUROLOGY

## 2018-06-25 PROCEDURE — 3017F COLORECTAL CA SCREEN DOC REV: CPT | Performed by: PSYCHIATRY & NEUROLOGY

## 2018-06-25 PROCEDURE — G8417 CALC BMI ABV UP PARAM F/U: HCPCS | Performed by: PSYCHIATRY & NEUROLOGY

## 2018-06-25 PROCEDURE — G8427 DOCREV CUR MEDS BY ELIG CLIN: HCPCS | Performed by: PSYCHIATRY & NEUROLOGY

## 2018-06-25 ASSESSMENT — ENCOUNTER SYMPTOMS
EYES NEGATIVE: 1
GASTROINTESTINAL NEGATIVE: 1
RESPIRATORY NEGATIVE: 1
ALLERGIC/IMMUNOLOGIC NEGATIVE: 1

## 2018-09-05 RX ORDER — PHENYTOIN SODIUM 100 MG/1
CAPSULE, EXTENDED RELEASE ORAL
Qty: 120 CAPSULE | Refills: 4 | Status: SHIPPED | OUTPATIENT
Start: 2018-09-05 | End: 2019-08-09 | Stop reason: ALTCHOICE

## 2018-09-12 ENCOUNTER — OFFICE VISIT (OUTPATIENT)
Dept: NEUROLOGY | Age: 68
End: 2018-09-12
Payer: MEDICARE

## 2018-09-12 VITALS
HEART RATE: 73 BPM | DIASTOLIC BLOOD PRESSURE: 68 MMHG | HEIGHT: 67 IN | WEIGHT: 205 LBS | BODY MASS INDEX: 32.18 KG/M2 | SYSTOLIC BLOOD PRESSURE: 125 MMHG

## 2018-09-12 DIAGNOSIS — D32.9 MENINGIOMA (HCC): ICD-10-CM

## 2018-09-12 DIAGNOSIS — G40.909 SEIZURE DISORDER (HCC): Primary | ICD-10-CM

## 2018-09-12 DIAGNOSIS — G93.9 TEMPORAL LOBE LESION: ICD-10-CM

## 2018-09-12 PROCEDURE — G8427 DOCREV CUR MEDS BY ELIG CLIN: HCPCS | Performed by: PSYCHIATRY & NEUROLOGY

## 2018-09-12 PROCEDURE — 4040F PNEUMOC VAC/ADMIN/RCVD: CPT | Performed by: PSYCHIATRY & NEUROLOGY

## 2018-09-12 PROCEDURE — 1036F TOBACCO NON-USER: CPT | Performed by: PSYCHIATRY & NEUROLOGY

## 2018-09-12 PROCEDURE — 99214 OFFICE O/P EST MOD 30 MIN: CPT | Performed by: PSYCHIATRY & NEUROLOGY

## 2018-09-12 PROCEDURE — 3017F COLORECTAL CA SCREEN DOC REV: CPT | Performed by: PSYCHIATRY & NEUROLOGY

## 2018-09-12 PROCEDURE — 1101F PT FALLS ASSESS-DOCD LE1/YR: CPT | Performed by: PSYCHIATRY & NEUROLOGY

## 2018-09-12 PROCEDURE — G8417 CALC BMI ABV UP PARAM F/U: HCPCS | Performed by: PSYCHIATRY & NEUROLOGY

## 2018-09-12 PROCEDURE — 1123F ACP DISCUSS/DSCN MKR DOCD: CPT | Performed by: PSYCHIATRY & NEUROLOGY

## 2018-09-12 RX ORDER — LEVETIRACETAM 1000 MG/1
1000 TABLET ORAL 2 TIMES DAILY
Qty: 60 TABLET | Refills: 11 | Status: SHIPPED | OUTPATIENT
Start: 2018-09-12 | End: 2019-08-09 | Stop reason: SDUPTHER

## 2018-09-12 RX ORDER — LEVETIRACETAM 1000 MG/1
1000 TABLET ORAL 2 TIMES DAILY
COMMUNITY
End: 2019-08-09 | Stop reason: ALTCHOICE

## 2018-09-12 RX ORDER — ASPIRIN 325 MG
325 TABLET ORAL 2 TIMES DAILY
COMMUNITY

## 2018-09-12 RX ORDER — PHENYTOIN SODIUM 100 MG/1
CAPSULE, EXTENDED RELEASE ORAL
Qty: 120 CAPSULE | Refills: 11 | Status: SHIPPED | OUTPATIENT
Start: 2018-09-12 | End: 2019-08-09 | Stop reason: SDUPTHER

## 2018-09-12 NOTE — PROGRESS NOTES
Subjective:      Patient ID: Ron Enriquez is a 79 y.o. male. 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 Last seizure in December 2017. There is right parietal meningioma . The condition is he has been seizure free since last seen in June remaining on dilantin 200 mg po bid, keppra 1000 mg po bid . There is occasional bilateral tinnitus with no focal motor ,sensory or bulbar complaints  He is following with local neurosurgeon Dr Natalia Crawford who is following brain imaging conservatively  . There are no headaches with mild memory complaints . Significant medications dilantin 200 mg po bid, keppra 1000 mg po bid , lipitor 80 mg po qd , aspirin 81 mg po qd  Testing MRI of Head noncontrast with inflammation 1.4 x 1.3 x .1.1 cm posterior left hippocampus and right fontal lobe along with right parietal 2 x 0.7 x 2.3 cm meningioma, March 2016 . CTA head and neck with no large vessel occlusion . CSF analysis 0 RBC, 0 WBC, total protein 66 , glucose 68 . EEG normal , June 2016 . CSF analysis 0 RBC, 0 WBC, total protein 66 , glucose 68. FU MRI less conspicuous left temporal lesion with right frontal convexity extraxial meningioma  2.4 x 1 cm with enhancement , June 2016 . FU MRI of Head stable appearing T2 hyperintense ill defined nonenhancing lesion in the left mesial temporal lobe with associated FLAIR and T2 signal hyperintensity involving the left hippocampus, December 2016. Total dilantin 21.1 ,Free dilantin 2.1 , August 2017 . MRI of Head with stable cystic appearance mass in left temporal lobe with surrounding edema . 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 Stable right frontal meningioma 2.8 x 1.7 cm with mild chronic periventricular small vessel ischemia and small righ frontal venous angioma , December 2017.   Xray right shoulder High-riding humeral head indicating Take 12.5 mg by mouth 2 times daily (with meals)      lisinopril (PRINIVIL;ZESTRIL) 10 MG tablet Take 10 mg by mouth 2 times daily        No current facility-administered medications for this visit. No Known Allergies    Review of Systems   Constitutional: Negative. HENT: Negative. Eyes: Negative. Respiratory: Negative. Cardiovascular: Positive for leg swelling. Gastrointestinal: Negative. Endocrine: Negative. Genitourinary: Negative. Musculoskeletal: Negative. Skin: Negative. Allergic/Immunologic: Negative. Neurological: Negative. Hematological: Negative. Psychiatric/Behavioral: Negative. Objective:   Physical Exam  Vitals:    09/12/18 0827   BP: 125/68   Pulse: 73     weight: 205 lb (93 kg)    Neurological Examination  Constitutional .General exam well groomed   Head/Ears /Nose/Throat: external ear . Normal exam  Neck and thyroid . Normal size. No bruits  Respiratory . Breathsounds clear bilaterally  Cardiovascular: Auscultation of heart with regular rate and rhythm  Musculoskeletal. Muscle bulk and tone normal                                                           Muscle strength 5/5 strength throughout                                                                                No dysmetria or dysdiadokinesis  No tremor   Normal fine motor  Gait normal   Orientation Alert and oriented x 3   Attention and concentration normal  Short term memory normal  Language process and speech normal . No aphasia   Cranial nerve 2 normal acuety and visual fields  Cranial nerve 3, 4 and 6 . Extraocular muscles are intact . Pupils are equal and reactive   Cranial nerve 5 . Normal strength of masseter and temporalis . Intact corneal reflex. Normal facial sensation  Cranial nerve 7 normal exam   Cranial nerve 8. Grossly intact hearing   Cranial nerve 9 and 10. Symmetric palate elevation   Cranial nerve 11 , 5 out of 5 strength   Cranial Nerve 12 midline tongue .  No

## 2018-09-12 NOTE — LETTER
 aspirin 325 MG tablet Take 325 mg by mouth 2 times daily      levETIRAcetam (KEPPRA) 1000 MG tablet Take 1 tablet by mouth 2 times daily 60 tablet 11    phenytoin (DILANTIN) 100 MG ER capsule Take 2 po bid 120 capsule 11    phenytoin (DILANTIN) 100 MG ER capsule TAKE 2 CAPSULES BY MOUTH TWICE A  capsule 4    diphenhydrAMINE (BENADRYL) 25 MG tablet Take 25 mg by mouth daily as needed       atorvastatin (LIPITOR) 80 MG tablet Take 80 mg by mouth daily      carvedilol (COREG) 12.5 MG tablet Take 12.5 mg by mouth 2 times daily (with meals)      lisinopril (PRINIVIL;ZESTRIL) 10 MG tablet Take 10 mg by mouth 2 times daily        No current facility-administered medications for this visit. No Known Allergies    Review of Systems   Constitutional: Negative. HENT: Negative. Eyes: Negative. Respiratory: Negative. Cardiovascular: Positive for leg swelling. Gastrointestinal: Negative. Endocrine: Negative. Genitourinary: Negative. Musculoskeletal: Negative. Skin: Negative. Allergic/Immunologic: Negative. Neurological: Negative. Hematological: Negative. Psychiatric/Behavioral: Negative. Objective:   Physical Exam  Vitals:    09/12/18 0827   BP: 125/68   Pulse: 73     weight: 205 lb (93 kg)    Neurological Examination  Constitutional .General exam well groomed   Head/Ears /Nose/Throat: external ear . Normal exam  Neck and thyroid . Normal size. No bruits  Respiratory . Breathsounds clear bilaterally  Cardiovascular:  Auscultation of heart with regular rate and rhythm  Musculoskeletal. Muscle bulk and tone normal                                                           Muscle strength 5/5 strength throughout                                                                                No dysmetria or dysdiadokinesis  No tremor   Normal fine motor  Gait normal   Orientation Alert and oriented x 3   Attention and concentration normal  Short term memory normal Language process and speech normal . No aphasia   Cranial nerve 2 normal acuety and visual fields  Cranial nerve 3, 4 and 6 . Extraocular muscles are intact . Pupils are equal and reactive   Cranial nerve 5 . Normal strength of masseter and temporalis . Intact corneal reflex. Normal facial sensation  Cranial nerve 7 normal exam   Cranial nerve 8. Grossly intact hearing   Cranial nerve 9 and 10. Symmetric palate elevation   Cranial nerve 11 , 5 out of 5 strength   Cranial Nerve 12 midline tongue . No atrophy  Sensation . Normal proprioception . Intact Vibration . Normal pinprick and light touch   Deep Tendon Reflexes normal  Plantar response flexor bilaterally    Assessment:       Diagnosis Orders   1. Seizure disorder ([facility] [address]     2. Temporal lobe lesion     3. Meningioma [facility])     Patient is doing well being seizure free to continue current medication administration       Plan:      As above         Yolette Frazier MD    If you have questions, please do not hesitate to call me. I look forward to following Ankush along with you.     Sincerely,        Yolette Frazier MD    CC providers:  Marleny Aleman MD  [address] [place] [address], [address]  [address] [facility]. [id number]  [address] [place] [address]: [phone]

## 2018-09-13 NOTE — COMMUNICATION BODY
Take 12.5 mg by mouth 2 times daily (with meals)      lisinopril (PRINIVIL;ZESTRIL) 10 MG tablet Take 10 mg by mouth 2 times daily        No current facility-administered medications for this visit. No Known Allergies    Review of Systems   Constitutional: Negative. HENT: Negative. Eyes: Negative. Respiratory: Negative. Cardiovascular: Positive for leg swelling. Gastrointestinal: Negative. Endocrine: Negative. Genitourinary: Negative. Musculoskeletal: Negative. Skin: Negative. Allergic/Immunologic: Negative. Neurological: Negative. Hematological: Negative. Psychiatric/Behavioral: Negative. Objective:   Physical Exam  Vitals:    09/12/18 0827   BP: 125/68   Pulse: 73     weight: 205 lb (93 kg)    Neurological Examination  Constitutional .General exam well groomed   Head/Ears /Nose/Throat: external ear . Normal exam  Neck and thyroid . Normal size. No bruits  Respiratory . Breathsounds clear bilaterally  Cardiovascular: Auscultation of heart with regular rate and rhythm  Musculoskeletal. Muscle bulk and tone normal                                                           Muscle strength 5/5 strength throughout                                                                                No dysmetria or dysdiadokinesis  No tremor   Normal fine motor  Gait normal   Orientation Alert and oriented x 3   Attention and concentration normal  Short term memory normal  Language process and speech normal . No aphasia   Cranial nerve 2 normal acuety and visual fields  Cranial nerve 3, 4 and 6 . Extraocular muscles are intact . Pupils are equal and reactive   Cranial nerve 5 . Normal strength of masseter and temporalis . Intact corneal reflex. Normal facial sensation  Cranial nerve 7 normal exam   Cranial nerve 8. Grossly intact hearing   Cranial nerve 9 and 10. Symmetric palate elevation   Cranial nerve 11 , 5 out of 5 strength   Cranial Nerve 12 midline tongue .  No atrophy  Sensation . Normal proprioception . Intact Vibration . Normal pinprick and light touch   Deep Tendon Reflexes normal  Plantar response flexor bilaterally    Assessment:       Diagnosis Orders   1. Seizure disorder ([address]     2. Temporal lobe lesion     3.  Meningioma [facility])     Patient is doing well being seizure free to continue current medication administration       Plan:      As above         Lesley Anne MD

## 2018-09-18 DIAGNOSIS — G40.909 SEIZURE DISORDER (HCC): ICD-10-CM

## 2019-08-09 ENCOUNTER — OFFICE VISIT (OUTPATIENT)
Dept: NEUROLOGY | Age: 69
End: 2019-08-09
Payer: MEDICARE

## 2019-08-09 VITALS
HEIGHT: 67 IN | HEART RATE: 60 BPM | WEIGHT: 210.2 LBS | SYSTOLIC BLOOD PRESSURE: 131 MMHG | BODY MASS INDEX: 32.99 KG/M2 | DIASTOLIC BLOOD PRESSURE: 74 MMHG

## 2019-08-09 DIAGNOSIS — D32.9 MENINGIOMA (HCC): ICD-10-CM

## 2019-08-09 DIAGNOSIS — G40.909 SEIZURE DISORDER (HCC): Primary | ICD-10-CM

## 2019-08-09 DIAGNOSIS — G93.9 TEMPORAL LOBE LESION: ICD-10-CM

## 2019-08-09 PROCEDURE — G8417 CALC BMI ABV UP PARAM F/U: HCPCS | Performed by: PSYCHIATRY & NEUROLOGY

## 2019-08-09 PROCEDURE — 99214 OFFICE O/P EST MOD 30 MIN: CPT | Performed by: PSYCHIATRY & NEUROLOGY

## 2019-08-09 PROCEDURE — 1123F ACP DISCUSS/DSCN MKR DOCD: CPT | Performed by: PSYCHIATRY & NEUROLOGY

## 2019-08-09 PROCEDURE — G8427 DOCREV CUR MEDS BY ELIG CLIN: HCPCS | Performed by: PSYCHIATRY & NEUROLOGY

## 2019-08-09 PROCEDURE — 4040F PNEUMOC VAC/ADMIN/RCVD: CPT | Performed by: PSYCHIATRY & NEUROLOGY

## 2019-08-09 PROCEDURE — 3017F COLORECTAL CA SCREEN DOC REV: CPT | Performed by: PSYCHIATRY & NEUROLOGY

## 2019-08-09 PROCEDURE — 4004F PT TOBACCO SCREEN RCVD TLK: CPT | Performed by: PSYCHIATRY & NEUROLOGY

## 2019-08-09 RX ORDER — LEVETIRACETAM 1000 MG/1
1000 TABLET ORAL 2 TIMES DAILY
Qty: 60 TABLET | Refills: 11 | Status: SHIPPED | OUTPATIENT
Start: 2019-08-09 | End: 2020-01-16 | Stop reason: SDUPTHER

## 2019-08-09 RX ORDER — PHENYTOIN SODIUM 100 MG/1
CAPSULE, EXTENDED RELEASE ORAL
Qty: 120 CAPSULE | Refills: 11 | Status: SHIPPED | OUTPATIENT
Start: 2019-08-09 | End: 2020-01-16 | Stop reason: SDUPTHER

## 2019-08-09 ASSESSMENT — ENCOUNTER SYMPTOMS
GASTROINTESTINAL NEGATIVE: 1
ALLERGIC/IMMUNOLOGIC NEGATIVE: 1
RESPIRATORY NEGATIVE: 1
EYES NEGATIVE: 1

## 2019-08-09 NOTE — LETTER
2016 .  FU MRI of Head stable appearing T2 hyperintense ill defined nonenhancing lesion in the left mesial temporal lobe with associated FLAIR and T2 signal hyperintensity involving the left hippocampus, December 2016. MRI of Head with stable cystic appearance mass in left temporal lobe with surrounding edema . 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 Stable right frontal meningioma 2.8 x 1.7 cm with mild chronic periventricular small vessel ischemia and small righ frontal venous angioma , December 2017. Xray right shoulder High-riding humeral head indicating prior rotator cuff injury, December 2017. LTME mild generalized slowing with focal left temporal slowing with poorly defined left temporal sharp wave , May 2018 . MRI of Head with dural based extraxial mass lateral to parietal lobe consistent with meningioma , May 2018 . Total dilantin level 15.1, free dilantin level 1.4, September 2018 .  MRI of Head right frontal meningioma 3 x 1.5 x 2.9 cm with scattered subcortical T2 weighed intensities , May 2019      Past Medical History:   Diagnosis Date    Cataracts, bilateral     Coronary artery disease     Heart attack ([address]     Hyperlipidemia     Hypertension     Seizures ([address]        Past Surgical History:   Procedure Laterality Date    CORONARY ANGIOPLASTY WITH STENT PLACEMENT  2014    2 stents placed    JOINT REPLACEMENT Right 04/26/2017    ROTATOR CUFF REPAIR      TOTAL KNEE ARTHROPLASTY Left 05/03/2018       Family History   Problem Relation Age of Onset    Stroke Mother     Stroke Father        Social History     Socioeconomic History    Marital status: Single     Spouse name: None    Number of children: None    Years of education: None    Highest education level: None   Occupational History    None   Social Needs    Financial resource strain: None    Food insecurity:     Worry: None     Inability: None    Transportation needs:     Medical: None     Non-medical: None Allergic/Immunologic: Negative. Neurological: Negative. Hematological: Negative. Psychiatric/Behavioral: Negative. Objective:   Physical Exam    Vitals:    08/09/19 0916   BP: 131/74   Pulse: 60     weight: 210 lb 3.2 oz (95.3 kg)    Neurological Examination  Constitutional .General exam well groomed   Head/Ears /Nose/Throat: external ear . Normal exam  Neck and thyroid . Normal size. No bruits  Respiratory . Breathsounds clear bilaterally  Cardiovascular: Auscultation of heart with regular rate and rhythm  Musculoskeletal. Muscle bulk and tone normal                                                           Muscle strength 5/5 strength throughout                                                                                No dysmetria or dysdiadokinesis  No tremor   Normal fine motor  Gait normal   Orientation Alert and oriented x 3   Attention and concentration normal  Short term memory normal  Language process and speech normal . No aphasia   Cranial nerve 2 normal acuety and visual fields  Cranial nerve 3, 4 and 6 . Extraocular muscles are intact . Pupils are equal and reactive   Cranial nerve 5 . Normal strength of masseter and temporalis . Intact corneal reflex. Normal facial sensation  Cranial nerve 7 normal exam   Cranial nerve 8. Grossly intact hearing   Cranial nerve 9 and 10. Symmetric palate elevation   Cranial nerve 11 , 5 out of 5 strength   Cranial Nerve 12 midline tongue . No atrophy  Sensation . Normal proprioception . Intact Vibration . Normal pinprick and light touch   Deep Tendon Reflexes normal  Plantar response flexor bilaterally    Assessment:       Diagnosis Orders   1. Seizure disorder ([facility])  phenytoin (DILANTIN) 100 MG ER capsule    levETIRAcetam (KEPPRA) 1000 MG tablet    ALT    AST    Phenytoin level, free    Phenytoin Level, Total    CBC With Auto Differential   2. Temporal lobe lesion     3.  Meningioma ([facility] [address]

## 2019-08-09 NOTE — PROGRESS NOTES
Subjective:      Patient ID: Pierrette Schwab is a 76 y.o. male. 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 having serial MRI studies  . Last seizure in December 2017. There is right parietal meningioma . The condition is he has been seizure free since last seen remaining on dilantin 200 mg po bid, keppra 1000 mg po bid tolerating medication well . There is occasional bilateral tinnitus with no focal motor ,sensory or bulbar complaints  He is following with local neurosurgeon Dr Nithin Pugh who is following brain imaging conservatively  . MRI of Head right frontal meningioma 3 x 1.5 x 2.9 cm with scattered subcortical T2 weighed intensities , May 2019 . There are no headaches with mild memory complaints . Significant medications dilantin 200 mg po bid, keppra 1000 mg po bid , lipitor 80 mg po qd , aspirin 81 mg po qd  Testing MRI of Head noncontrast with inflammation 1.4 x 1.3 x .1.1 cm posterior left hippocampus and right fontal lobe along with right parietal 2 x 0.7 x 2.3 cm meningioma, March 2016 . CTA head and neck with no large vessel occlusion . CSF analysis 0 RBC, 0 WBC, total protein 66 , glucose 68 . EEG normal , June 2016 . CSF analysis 0 RBC, 0 WBC, total protein 66 , glucose 68. FU MRI less conspicuous left temporal lesion with right frontal convexity extraxial meningioma  2.4 x 1 cm with enhancement , June 2016 . FU MRI of Head stable appearing T2 hyperintense ill defined nonenhancing lesion in the left mesial temporal lobe with associated FLAIR and T2 signal hyperintensity involving the left hippocampus, December 2016. MRI of Head with stable cystic appearance mass in left temporal lobe with surrounding edema . 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  Stable right frontal meningioma 2.8 x 1.7 cm with mild chronic periventricular small vessel ischemia and small righ frontal phone: None     Gets together: None     Attends Zoroastrian service: None     Active member of club or organization: None     Attends meetings of clubs or organizations: None     Relationship status: None    Intimate partner violence:     Fear of current or ex partner: None     Emotionally abused: None     Physically abused: None     Forced sexual activity: None   Other Topics Concern    None   Social History Narrative    None       Current Outpatient Medications   Medication Sig Dispense Refill    phenytoin (DILANTIN) 100 MG ER capsule Take 2 po bid 120 capsule 11    levETIRAcetam (KEPPRA) 1000 MG tablet Take 1 tablet by mouth 2 times daily 60 tablet 11    aspirin 325 MG tablet Take 325 mg by mouth 2 times daily      diphenhydrAMINE (BENADRYL) 25 MG tablet Take 25 mg by mouth daily as needed       atorvastatin (LIPITOR) 80 MG tablet Take 80 mg by mouth daily      carvedilol (COREG) 12.5 MG tablet Take 12.5 mg by mouth 2 times daily (with meals)      lisinopril (PRINIVIL;ZESTRIL) 10 MG tablet Take 10 mg by mouth 2 times daily        No current facility-administered medications for this visit. No Known Allergies    Review of Systems   Constitutional: Negative. HENT: Negative. Eyes: Negative. Respiratory: Negative. Cardiovascular: Positive for leg swelling. Gastrointestinal: Negative. Endocrine: Negative. Genitourinary: Negative. Musculoskeletal: Negative. Skin: Negative. Allergic/Immunologic: Negative. Neurological: Negative. Hematological: Negative. Psychiatric/Behavioral: Negative. Objective:   Physical Exam    Vitals:    08/09/19 0916   BP: 131/74   Pulse: 60     weight: 210 lb 3.2 oz (95.3 kg)    Neurological Examination  Constitutional .General exam well groomed   Head/Ears /Nose/Throat: external ear . Normal exam  Neck and thyroid . Normal size. No bruits  Respiratory . Breathsounds clear bilaterally  Cardiovascular:  Auscultation of heart with regular

## 2019-08-14 DIAGNOSIS — G40.909 SEIZURE DISORDER (HCC): ICD-10-CM

## 2019-08-20 NOTE — COMMUNICATION BODY
Subjective:      Patient ID: Jamar Morales is a 76 y.o. male. 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 Last seizure in December 2017. There is right parietal meningioma . The condition is he has been seizure free since last seen remaining on dilantin 200 mg po bid, keppra 1000 mg po bid tolerating medication well . There is occasional bilateral tinnitus with no focal motor ,sensory or bulbar complaints  He is following with local neurosurgeon Dr Francie Peralta who is following brain imaging conservatively  . MRI of Head right frontal meningioma 3 x 1.5 x 2.9 cm with scattered subcortical T2 weighed intensities , May 2019 . There are no headaches with mild memory complaints . Significant medications dilantin 200 mg po bid, keppra 1000 mg po bid , lipitor 80 mg po qd , aspirin 81 mg po qd  Testing MRI of Head noncontrast with inflammation 1.4 x 1.3 x .1.1 cm posterior left hippocampus and right fontal lobe along with right parietal 2 x 0.7 x 2.3 cm meningioma, March 2016 . CTA head and neck with no large vessel occlusion . CSF analysis 0 RBC, 0 WBC, total protein 66 , glucose 68 . EEG normal , June 2016 . CSF analysis 0 RBC, 0 WBC, total protein 66 , glucose 68. FU MRI less conspicuous left temporal lesion with right frontal convexity extraxial meningioma  2.4 x 1 cm with enhancement , June 2016 . FU MRI of Head stable appearing T2 hyperintense ill defined nonenhancing lesion in the left mesial temporal lobe with associated FLAIR and T2 signal hyperintensity involving the left hippocampus, December 2016. MRI of Head with stable cystic appearance mass in left temporal lobe with surrounding edema . 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  Stable right frontal meningioma 2.8 x 1.7 cm with mild chronic periventricular small vessel ischemia and small righ frontal venous angioma , 2017. Xray right shoulder High-riding humeral head indicating prior rotator cuff injury, 2017. LTME mild generalized slowing with focal left temporal slowing with poorly defined left temporal sharp wave , May 2018 . MRI of Head with dural based extraxial mass lateral to parietal lobe consistent with meningioma , May 2018 . Total dilantin level 15.1, free dilantin level 1.4, 2018 . MRI of Head right frontal meningioma 3 x 1.5 x 2.9 cm with scattered subcortical T2 weighed intensities , May 2019      Past Medical History:   Diagnosis Date    Cataracts, bilateral     Coronary artery disease     Heart attack ([address]     Hyperlipidemia     Hypertension     Seizures (HCC)        Past Surgical History:   Procedure Laterality Date    CORONARY ANGIOPLASTY WITH STENT PLACEMENT      2 stents placed    JOINT REPLACEMENT Right 2017    ROTATOR CUFF REPAIR      TOTAL KNEE ARTHROPLASTY Left 2018       Family History   Problem Relation Age of Onset    Stroke Mother     Stroke Father        Social History     Socioeconomic History    Marital status: Single     Spouse name: None    Number of children: None    Years of education: None    Highest education level: None   Occupational History    None   Social Needs    Financial resource strain: None    Food insecurity:     Worry: None     Inability: None    Transportation needs:     Medical: None     Non-medical: None   Tobacco Use    Smoking status: Current Some Day Smoker     Types: Cigarettes     Last attempt to quit: 2017     Years since quittin.3    Smokeless tobacco: Never Used   Substance and Sexual Activity    Alcohol use:  Yes     Alcohol/week: 0.0 standard drinks     Comment: occassionally    Drug use: No    Sexual activity: None   Lifestyle    Physical activity:     Days per week: None     Minutes per session: None    Stress: None   Relationships    Social connections:     Talks on Occurrences:   1     Standing Expiration Date:   8/8/2020     Order Specific Question:   Dose Schedule & Time of Last Dose?      Answer:   night    CBC With Auto Differential     Standing Status:   Future     Number of Occurrences:   1     Standing Expiration Date:   8/9/2020           Leandro Acosta MD

## 2020-01-17 ENCOUNTER — TELEPHONE (OUTPATIENT)
Dept: NEUROLOGY | Age: 70
End: 2020-01-17

## 2020-01-17 RX ORDER — PHENYTOIN SODIUM 100 MG/1
CAPSULE, EXTENDED RELEASE ORAL
Qty: 360 CAPSULE | Refills: 2 | Status: SHIPPED | OUTPATIENT
Start: 2020-01-17 | End: 2020-11-09

## 2020-01-17 RX ORDER — LEVETIRACETAM 1000 MG/1
1000 TABLET ORAL 2 TIMES DAILY
Qty: 180 TABLET | Refills: 2 | Status: SHIPPED | OUTPATIENT
Start: 2020-01-17 | End: 2020-11-09

## 2020-01-17 NOTE — TELEPHONE ENCOUNTER
I discussed with Dr. Mcgarry Parents. He said that Rudy has been on these two medications for some time now and he did not think they would be causing the bad dreams and difficulty sleeping. I called and left a message for Rudy on his voice mail.

## 2020-01-17 NOTE — TELEPHONE ENCOUNTER
Jaci Morocho called in. he has been having bad dreams and also difficulty sleeping for the past month. He is on Keppra 1000 mg. bid and Atorvastatin along with some other meds but he is thinking that possibly one of these two are causing him the problems with dreams and sleep. Will check with Dr. Alexia Calvo.

## 2020-11-09 RX ORDER — PHENYTOIN SODIUM 100 MG/1
CAPSULE, EXTENDED RELEASE ORAL
Qty: 360 CAPSULE | Refills: 2 | Status: SHIPPED | OUTPATIENT
Start: 2020-11-09 | End: 2021-06-02 | Stop reason: SDUPTHER

## 2020-11-09 RX ORDER — LEVETIRACETAM 1000 MG/1
TABLET ORAL
Qty: 180 TABLET | Refills: 2 | Status: SHIPPED | OUTPATIENT
Start: 2020-11-09 | End: 2020-11-18 | Stop reason: CLARIF

## 2020-11-09 NOTE — TELEPHONE ENCOUNTER
Pharmacy requesting a  refill of Keppra 100mg.       Medication active on med list yes      Date of last prescription 11/17/2019  with 2 refills verified on 11/09/2020    verified by MUNA SAAVEDRA      Date of last appointment 08/09/2019    Next Visit Date:  Visit date not found

## 2020-11-18 ENCOUNTER — OFFICE VISIT (OUTPATIENT)
Dept: NEUROLOGY | Age: 70
End: 2020-11-18
Payer: MEDICARE

## 2020-11-18 VITALS
HEART RATE: 72 BPM | SYSTOLIC BLOOD PRESSURE: 138 MMHG | DIASTOLIC BLOOD PRESSURE: 72 MMHG | TEMPERATURE: 97.2 F | HEIGHT: 67 IN | BODY MASS INDEX: 32.83 KG/M2 | WEIGHT: 209.2 LBS

## 2020-11-18 PROCEDURE — 4004F PT TOBACCO SCREEN RCVD TLK: CPT | Performed by: PSYCHIATRY & NEUROLOGY

## 2020-11-18 PROCEDURE — 99214 OFFICE O/P EST MOD 30 MIN: CPT | Performed by: PSYCHIATRY & NEUROLOGY

## 2020-11-18 PROCEDURE — G8427 DOCREV CUR MEDS BY ELIG CLIN: HCPCS | Performed by: PSYCHIATRY & NEUROLOGY

## 2020-11-18 PROCEDURE — 4040F PNEUMOC VAC/ADMIN/RCVD: CPT | Performed by: PSYCHIATRY & NEUROLOGY

## 2020-11-18 PROCEDURE — 3017F COLORECTAL CA SCREEN DOC REV: CPT | Performed by: PSYCHIATRY & NEUROLOGY

## 2020-11-18 PROCEDURE — 1123F ACP DISCUSS/DSCN MKR DOCD: CPT | Performed by: PSYCHIATRY & NEUROLOGY

## 2020-11-18 PROCEDURE — G8484 FLU IMMUNIZE NO ADMIN: HCPCS | Performed by: PSYCHIATRY & NEUROLOGY

## 2020-11-18 PROCEDURE — G8417 CALC BMI ABV UP PARAM F/U: HCPCS | Performed by: PSYCHIATRY & NEUROLOGY

## 2020-11-18 ASSESSMENT — ENCOUNTER SYMPTOMS
ALLERGIC/IMMUNOLOGIC NEGATIVE: 1
GASTROINTESTINAL NEGATIVE: 1
EYES NEGATIVE: 1
RESPIRATORY NEGATIVE: 1

## 2020-11-18 NOTE — PROGRESS NOTES
Subjective:      Patient ID: Sebastian Ortega is a 79 y.o. male. 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 Last seizure in December 2017. Seizure type is staring with decreased interaction to outside stimulus . There is right parietal meningioma . The condition is he has been seizure free since last seen remaining on dilantin 200 mg po bid with keppra having been weaned . There is mild postural instability on his feet with no dizziness . There is occasional bilateral tinnitus with no focal motor ,sensory or bulbar complaints. He has not seen local neurosurgeon Dr Gracia Faye for over a year . There are no headaches with no memory complaints . Significant medications dilantin 200 mg po bid,  lipitor 80 mg po qd , aspirin 81 mg po qd . Previously on keppra . Testing MRI of Head noncontrast with inflammation 1.4 x 1.3 x .1.1 cm posterior left hippocampus and right fontal lobe along with right parietal 2 x 0.7 x 2.3 cm meningioma, March 2016 . CTA head and neck with no large vessel occlusion . CSF analysis 0 RBC, 0 WBC, total protein 66 , glucose 68 . EEG normal , June 2016 . CSF analysis 0 RBC, 0 WBC, total protein 66 , glucose 68. FU MRI less conspicuous left temporal lesion with right frontal convexity extraxial meningioma  2.4 x 1 cm with enhancement , June 2016 . FU MRI of Head stable appearing T2 hyperintense ill defined nonenhancing lesion in the left mesial temporal lobe with associated FLAIR and T2 signal hyperintensity involving the left hippocampus, December 2016. MRI of Head with stable cystic appearance mass in left temporal lobe with surrounding edema . 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 Stable right frontal meningioma 2.8 x 1.7 cm with mild chronic periventricular small vessel ischemia and small righ frontal venous angioma , December 2017.   Xray right shoulder High-riding humeral head indicating prior rotator cuff injury, December 2017. LTME mild generalized slowing with focal left temporal slowing with poorly defined left temporal sharp wave , May 2018 . MRI of Head with dural based extraxial mass lateral to parietal lobe consistent with meningioma , May 2018 . MRI of Head right frontal meningioma 3 x 1.5 x 2.9 cm with scattered subcortical T2 weighed intensities , May 2019 . Total dilantin 11.8 , Free dilantin 1.1 , August 2019      Past Medical History:   Diagnosis Date    Cataracts, bilateral     Coronary artery disease     Heart attack ([address]     Hyperlipidemia     Hypertension     Seizures (HCC)        Past Surgical History:   Procedure Laterality Date    CORONARY ANGIOPLASTY WITH STENT PLACEMENT  2014    2 stents placed    JOINT REPLACEMENT Right 04/26/2017    ROTATOR CUFF REPAIR      TOTAL KNEE ARTHROPLASTY Left 05/03/2018       Family History   Problem Relation Age of Onset    Stroke Mother     Stroke Father        Social History     Socioeconomic History    Marital status: Single     Spouse name: None    Number of children: None    Years of education: None    Highest education level: None   Occupational History    None   Social Needs    Financial resource strain: None    Food insecurity     Worry: None     Inability: None    Transportation needs     Medical: None     Non-medical: None   Tobacco Use    Smoking status: Current Some Day Smoker     Packs/day: 0.25     Years: 15.00     Pack years: 3.75     Types: Cigarettes     Last attempt to quit: 4/26/2017     Years since quitting: 3.5    Smokeless tobacco: Never Used   Substance and Sexual Activity    Alcohol use:  Yes     Alcohol/week: 0.0 standard drinks     Comment: occassionally    Drug use: No    Sexual activity: None   Lifestyle    Physical activity     Days per week: None     Minutes per session: None    Stress: None   Relationships    Social connections     Talks on phone: None     Gets Muscle strength 5/5 strength throughout                                                                                No dysmetria or dysdiadokinesis  No tremor   Normal fine motor  Gait normal   Orientation Alert and oriented x 3   Attention and concentration normal  Short term memory normal  Language process and speech normal . No aphasia   Cranial nerve 2 normal acuety and visual fields  Cranial nerve 3, 4 and 6 . Extraocular muscles are intact . Pupils are equal and reactive   Cranial nerve 5 . Normal strength of masseter and temporalis . Intact corneal reflex. Normal facial sensation  Cranial nerve 7 normal exam   Cranial nerve 8. Grossly intact hearing   Cranial nerve 9 and 10. Symmetric palate elevation   Cranial nerve 11 , 5 out of 5 strength   Cranial Nerve 12 midline tongue . No atrophy  Sensation . Normal proprioception . Intact Vibration . Normal pinprick and light touch   Deep Tendon Reflexes normal  Plantar response flexor bilaterally    Assessment:       Diagnosis Orders   1. Seizure disorder (HCC)  AST    Phenytoin level, free    Phenytoin Level, Total    CBC With Auto Differential    MRI BRAIN W WO CONTRAST    ALT   2. Brain lesion  MRI BRAIN W WO CONTRAST   Patient is to undergo followup MRI of Head along with dilantin bloodwork      Plan:      Orders Placed This Encounter   Procedures    MRI BRAIN W WO CONTRAST     Standing Status:   Future     Standing Expiration Date:   11/18/2021    AST     Standing Status:   Future     Standing Expiration Date:   11/18/2021    Phenytoin level, free     Standing Status:   Future     Standing Expiration Date:   11/18/2021    Phenytoin Level, Total     Standing Status:   Future     Standing Expiration Date:   11/18/2021     Order Specific Question:   Dose Schedule & Time of Last Dose?      Answer:   night    CBC With Auto Differential     Standing Status:   Future     Standing Expiration Date:   11/18/2021    ALT Standing Status:   Future     Standing Expiration Date:   11/18/2021           Morgan Pearson MD

## 2020-11-20 NOTE — TELEPHONE ENCOUNTER
Please call in xanax 0.5 mg #2 . Take one 15 minutes before study . May repeat x1 .  Have someone drive him

## 2020-11-24 RX ORDER — ALPRAZOLAM 0.5 MG/1
TABLET ORAL
Qty: 2 TABLET | Refills: 0 | Status: SHIPPED | OUTPATIENT
Start: 2020-11-24 | End: 2020-11-27

## 2020-11-24 NOTE — TELEPHONE ENCOUNTER
Pt called in asking about this medication. I told him that we can send it in for him and he would need a . He stated his understanding.

## 2020-12-03 ENCOUNTER — TELEPHONE (OUTPATIENT)
Dept: NEUROLOGY | Age: 70
End: 2020-12-03

## 2020-12-03 NOTE — TELEPHONE ENCOUNTER
Ankush called to let you know that he cancelled his MRI and lab work that was scheduled for today. Its not that he does not want to have them done. He just does not want to got to the hospital and have them done at this time because of Covid 19.

## 2021-03-17 ENCOUNTER — TELEPHONE (OUTPATIENT)
Dept: NEUROLOGY | Age: 71
End: 2021-03-17

## 2021-03-17 DIAGNOSIS — G93.9 BRAIN LESION: Primary | ICD-10-CM

## 2021-03-17 NOTE — TELEPHONE ENCOUNTER
Cesilia called in about refill on levetiracetam. I called Felton Smart because Dr. Bernardo Breaux note from November said he is not taking it. I did confirm with Felton Smart that he is not taking it. He said Dr. Peng Mcdaniel stopped it because he hadn't had any seizures in over one year. He did want me to check with Dr. Crawford Earsandro to see if he should go ahead and have the MRI repeated now. He said he had not had it done in the fall due to Covid. He feels more comfortable now and would go to [facility] if Dr. Ty Parsons would like it done.

## 2021-03-23 DIAGNOSIS — G93.9 BRAIN LESION: ICD-10-CM

## 2021-06-02 ENCOUNTER — OFFICE VISIT (OUTPATIENT)
Dept: NEUROLOGY | Age: 71
End: 2021-06-02
Payer: MEDICARE

## 2021-06-02 VITALS
HEIGHT: 67 IN | WEIGHT: 201 LBS | SYSTOLIC BLOOD PRESSURE: 130 MMHG | TEMPERATURE: 97.2 F | BODY MASS INDEX: 31.55 KG/M2 | DIASTOLIC BLOOD PRESSURE: 79 MMHG | HEART RATE: 72 BPM

## 2021-06-02 DIAGNOSIS — G40.909 SEIZURE DISORDER (HCC): ICD-10-CM

## 2021-06-02 DIAGNOSIS — G93.9 BRAIN LESION: Primary | ICD-10-CM

## 2021-06-02 PROCEDURE — 3017F COLORECTAL CA SCREEN DOC REV: CPT | Performed by: PSYCHIATRY & NEUROLOGY

## 2021-06-02 PROCEDURE — G8427 DOCREV CUR MEDS BY ELIG CLIN: HCPCS | Performed by: PSYCHIATRY & NEUROLOGY

## 2021-06-02 PROCEDURE — 4040F PNEUMOC VAC/ADMIN/RCVD: CPT | Performed by: PSYCHIATRY & NEUROLOGY

## 2021-06-02 PROCEDURE — G8417 CALC BMI ABV UP PARAM F/U: HCPCS | Performed by: PSYCHIATRY & NEUROLOGY

## 2021-06-02 PROCEDURE — 99214 OFFICE O/P EST MOD 30 MIN: CPT | Performed by: PSYCHIATRY & NEUROLOGY

## 2021-06-02 PROCEDURE — 4004F PT TOBACCO SCREEN RCVD TLK: CPT | Performed by: PSYCHIATRY & NEUROLOGY

## 2021-06-02 PROCEDURE — 1123F ACP DISCUSS/DSCN MKR DOCD: CPT | Performed by: PSYCHIATRY & NEUROLOGY

## 2021-06-02 RX ORDER — PHENYTOIN SODIUM 100 MG/1
CAPSULE, EXTENDED RELEASE ORAL
Qty: 360 CAPSULE | Refills: 3 | Status: SHIPPED | OUTPATIENT
Start: 2021-06-02 | End: 2022-06-17

## 2021-06-02 ASSESSMENT — ENCOUNTER SYMPTOMS
ALLERGIC/IMMUNOLOGIC NEGATIVE: 1
GASTROINTESTINAL NEGATIVE: 1
RESPIRATORY NEGATIVE: 1
EYES NEGATIVE: 1

## 2021-06-02 NOTE — PROGRESS NOTES
Subjective:      Patient ID: Steve Meeks is a 79 y.o. male. 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 Last seizure in December 2017. Seizure type is staring with decreased interaction to outside stimulus . There is right parietal meningioma . The condition is FU MRI mild interval increase size of right frontal meningioma 3.3 x 1.4 x 3.6 cm . Focal area of T2 hyperintensity medial left temporal lobe with no enhancement possible sequale of infarction along with chronic periventricular small vessel ischemia , March 2021. He has been seizure free since last seen remaining on dilantin 200 mg po bid . There is mild postural instability on his feet with no dizziness . There is occasional bilateral tinnitus with no focal motor ,sensory or bulbar complaints. There are no headaches with no memory complaints . Significant medications dilantin 200 mg po bid,  lipitor 80 mg po qd , aspirin 81 mg po qd . Previously on keppra . Testing MRI of Head noncontrast with inflammation 1.4 x 1.3 x .1.1 cm posterior left hippocampus and right fontal lobe along with right parietal 2 x 0.7 x 2.3 cm meningioma, March 2016 . CTA head and neck with no large vessel occlusion . CSF analysis 0 RBC, 0 WBC, total protein 66 , glucose 68 . EEG normal , June 2016 . CSF analysis 0 RBC, 0 WBC, total protein 66 , glucose 68. FU MRI less conspicuous left temporal lesion with right frontal convexity extraxial meningioma  2.4 x 1 cm with enhancement , June 2016 . FU MRI of Head stable appearing T2 hyperintense ill defined nonenhancing lesion in the left mesial temporal lobe with associated FLAIR and T2 signal hyperintensity involving the left hippocampus, December 2016. MRI of Head with stable cystic appearance mass in left temporal lobe with surrounding edema . 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  Stable right frontal meningioma 2.8 x 1.7 cm with mild chronic periventricular small vessel ischemia and small righ frontal venous angioma , 2017. LTME mild generalized slowing with focal left temporal slowing with poorly defined left temporal sharp wave , May 2018 . MRI of Head with dural based extraxial mass lateral to parietal lobe consistent with meningioma , May 2018 . MRI of Head right frontal meningioma 3 x 1.5 x 2.9 cm with scattered subcortical T2 weighed intensities , May 2019 . Total dilantin 11.8 , Free dilantin 1.1 , 2019 . FU MRI mild interval increase size of right frontal meningioma 3.3 x 1.4 x 3.6 cm . Focal area of T2 hyperintensity medial left temporal lobe with no enhancement possible sequale of infarction along with chronic periventricular small vessel ischemia , 2021      Past Medical History:   Diagnosis Date    Cataracts, bilateral     Coronary artery disease     Heart attack ([address]     Hyperlipidemia     Hypertension     Seizures (HCC)        Past Surgical History:   Procedure Laterality Date    CORONARY ANGIOPLASTY WITH STENT PLACEMENT      2 stents placed    JOINT REPLACEMENT Right 2017    ROTATOR CUFF REPAIR      TOTAL KNEE ARTHROPLASTY Left 2018       Family History   Problem Relation Age of Onset    Stroke Mother     Stroke Father        Social History     Socioeconomic History    Marital status: Single     Spouse name: None    Number of children: None    Years of education: None    Highest education level: None   Occupational History    None   Tobacco Use    Smoking status: Current Some Day Smoker     Packs/day: 0.25     Years: 15.00     Pack years: 3.75     Types: Cigarettes     Last attempt to quit: 2017     Years since quittin.1    Smokeless tobacco: Never Used   Vaping Use    Vaping Use: Never used   Substance and Sexual Activity    Alcohol use:  Yes     Alcohol/week: 0.0 standard drinks     Comment: occassionally    Drug use: No    Sexual activity: None   Other Topics Concern    None   Social History Narrative    None     Social Determinants of Health     Financial Resource Strain:     Difficulty of Paying Living Expenses:    Food Insecurity:     Worried About Running Out of Food in the Last Year:     [address] in the Last Year:    Transportation Needs:     Lack of Transportation (Medical):  Lack of Transportation (Non-Medical):    Physical Activity:     Days of Exercise per Week:     Minutes of Exercise per Session:    Stress:     Feeling of Stress :    Social Connections:     Frequency of Communication with Friends and Family:     Frequency of Social Gatherings with Friends and Family:     Attends [employer]ologist Services:     Active Member of Clubs or Organizations:     Attends Club or Organization Meetings:     Marital Status:    Intimate Partner Violence:     Fear of Current or Ex-Partner:     Emotionally Abused:     Physically Abused:     Sexually Abused:        Current Outpatient Medications   Medication Sig Dispense Refill    phenytoin (DILANTIN) 100 MG ER capsule TAKE 2 CAPSULES TWICE DAILY 360 capsule 3    aspirin 325 MG tablet Take 325 mg by mouth 2 times daily      diphenhydrAMINE (BENADRYL) 25 MG tablet Take 25 mg by mouth daily as needed       atorvastatin (LIPITOR) 80 MG tablet Take 80 mg by mouth daily      carvedilol (COREG) 12.5 MG tablet Take 12.5 mg by mouth 2 times daily (with meals)      lisinopril (PRINIVIL;ZESTRIL) 10 MG tablet Take 10 mg by mouth 2 times daily        No current facility-administered medications for this visit. No Known Allergies    Review of Systems   Constitutional: Negative. HENT: Negative. Eyes: Negative. Respiratory: Negative. Cardiovascular: Positive for leg swelling. Gastrointestinal: Negative. Endocrine: Negative. Genitourinary: Negative. Musculoskeletal: Negative. Skin: Negative. Allergic/Immunologic: Negative.     Neurological: Future     Standing Expiration Date:   6/2/2022    AST     Standing Status:   Future     Standing Expiration Date:   6/2/2022    Phenytoin level, free     Standing Status:   Future     Standing Expiration Date:   6/2/2022    Phenytoin Level, Total     Standing Status:   Future     Standing Expiration Date:   6/2/2022     Order Specific Question:   Dose Schedule & Time of Last Dose?      Answer:   night    CBC With Auto Differential     Standing Status:   Future     Standing Expiration Date:   6/2/2022           William Verdin MD

## 2021-06-03 DIAGNOSIS — G40.909 SEIZURE DISORDER (HCC): ICD-10-CM

## 2022-06-17 DIAGNOSIS — G40.909 SEIZURE DISORDER (HCC): ICD-10-CM

## 2022-06-17 NOTE — TELEPHONE ENCOUNTER
Pharmacy requesting refill of Dilantin 100 mg.       Medication active on med list yes      Date of last Rx: 6/2/2021 with 3 refills          verified by GADIEL ALLAN      Date of last appointment 6/2/2021    Next Visit Date:  Visit date not found

## 2022-06-20 RX ORDER — PHENYTOIN SODIUM 100 MG/1
CAPSULE, EXTENDED RELEASE ORAL
Qty: 360 CAPSULE | Refills: 0 | Status: SHIPPED | OUTPATIENT
Start: 2022-06-20 | End: 2022-10-05 | Stop reason: SDUPTHER

## 2022-06-27 ENCOUNTER — TELEPHONE (OUTPATIENT)
Dept: NEUROLOGY | Age: 72
End: 2022-06-27

## 2022-10-05 ENCOUNTER — OFFICE VISIT (OUTPATIENT)
Dept: NEUROLOGY | Age: 72
End: 2022-10-05
Payer: MEDICARE

## 2022-10-05 VITALS
DIASTOLIC BLOOD PRESSURE: 70 MMHG | SYSTOLIC BLOOD PRESSURE: 130 MMHG | HEIGHT: 67 IN | HEART RATE: 60 BPM | BODY MASS INDEX: 29.82 KG/M2 | WEIGHT: 190 LBS

## 2022-10-05 DIAGNOSIS — G93.9 BRAIN LESION: Primary | ICD-10-CM

## 2022-10-05 DIAGNOSIS — G40.909 SEIZURE DISORDER (HCC): ICD-10-CM

## 2022-10-05 PROCEDURE — 4004F PT TOBACCO SCREEN RCVD TLK: CPT | Performed by: PSYCHIATRY & NEUROLOGY

## 2022-10-05 PROCEDURE — 1123F ACP DISCUSS/DSCN MKR DOCD: CPT | Performed by: PSYCHIATRY & NEUROLOGY

## 2022-10-05 PROCEDURE — 3017F COLORECTAL CA SCREEN DOC REV: CPT | Performed by: PSYCHIATRY & NEUROLOGY

## 2022-10-05 PROCEDURE — 99214 OFFICE O/P EST MOD 30 MIN: CPT | Performed by: PSYCHIATRY & NEUROLOGY

## 2022-10-05 PROCEDURE — G8484 FLU IMMUNIZE NO ADMIN: HCPCS | Performed by: PSYCHIATRY & NEUROLOGY

## 2022-10-05 PROCEDURE — G8427 DOCREV CUR MEDS BY ELIG CLIN: HCPCS | Performed by: PSYCHIATRY & NEUROLOGY

## 2022-10-05 PROCEDURE — G8417 CALC BMI ABV UP PARAM F/U: HCPCS | Performed by: PSYCHIATRY & NEUROLOGY

## 2022-10-05 RX ORDER — ALPRAZOLAM 0.5 MG/1
TABLET ORAL
Qty: 2 TABLET | Refills: 0 | Status: SHIPPED | OUTPATIENT
Start: 2022-10-05 | End: 2023-11-05

## 2022-10-05 RX ORDER — PHENYTOIN SODIUM 100 MG/1
CAPSULE, EXTENDED RELEASE ORAL
Qty: 360 CAPSULE | Refills: 3 | Status: SHIPPED | OUTPATIENT
Start: 2022-10-05

## 2022-10-05 ASSESSMENT — ENCOUNTER SYMPTOMS
GASTROINTESTINAL NEGATIVE: 1
RESPIRATORY NEGATIVE: 1
EYES NEGATIVE: 1
ALLERGIC/IMMUNOLOGIC NEGATIVE: 1

## 2022-10-05 NOTE — PROGRESS NOTES
Subjective:      Patient ID: Tami Peraza is a 70 y.o. male. 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 Last seizure in December 2017. Seizure type is staring with decreased interaction to outside stimulus . There is right parietal meningioma . The condition is he has had no seizures since last seen on dilantin 200 mg po bid tolerating well . There are no headaches with no weakness , numbness . There is no gait disturbance . He had COVID developing lost mcfarland and smell that has recovered . There is occasional bilateral tinnitus with no focal motor ,sensory or bulbar complaints. There are no headaches with no memory complaints . Significant medications dilantin 200 mg po bid,  lipitor 80 mg po qd , aspirin 81 mg po qd . Previously on keppra . Testing MRI of Head noncontrast with inflammation 1.4 x 1.3 x .1.1 cm posterior left hippocampus and right fontal lobe along with right parietal 2 x 0.7 x 2.3 cm meningioma, March 2016 . CTA head and neck with no large vessel occlusion . CSF analysis 0 RBC, 0 WBC, total protein 66 , glucose 68 . EEG normal , June 2016 . CSF analysis 0 RBC, 0 WBC, total protein 66 , glucose 68. FU MRI less conspicuous left temporal lesion with right frontal convexity extraxial meningioma  2.4 x 1 cm with enhancement , June 2016 . FU MRI of Head stable appearing T2 hyperintense ill defined nonenhancing lesion in the left mesial temporal lobe with associated FLAIR and T2 signal hyperintensity involving the left hippocampus, December 2016. MRI of Head with stable cystic appearance mass in left temporal lobe with surrounding edema . 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 Stable right frontal meningioma 2.8 x 1.7 cm with mild chronic periventricular small vessel ischemia and small righ frontal venous angioma , December 2017.   LTME mild generalized slowing with focal left temporal slowing with poorly defined left temporal sharp wave , May 2018 . MRI of Head with dural based extraxial mass lateral to parietal lobe consistent with meningioma , May 2018 . MRI of Head right frontal meningioma 3 x 1.5 x 2.9 cm with scattered subcortical T2 weighed intensities , May 2019 . Total dilantin 11.8 , Free dilantin 1.1 , 2019 . FU MRI mild interval increase size of right frontal meningioma 3.3 x 1.4 x 3.6 cm . Focal area of T2 hyperintensity medial left temporal lobe with no enhancement possible sequale of infarction along with chronic periventricular small vessel ischemia , 2021 . Free dilantin 1.4 , Total dilantin 12.0 , 2021      Past Medical History:   Diagnosis Date    Cataracts, bilateral     Coronary artery disease     Heart [address] ([facility]     Hyperlipidemia     Hypertension     Seizures ([facility]        Past Surgical History:   Procedure Laterality Date    CORONARY ANGIOPLASTY WITH STENT PLACEMENT      2 stents placed    JOINT REPLACEMENT Right 2017    ROTATOR CUFF REPAIR      TOTAL KNEE ARTHROPLASTY Left 2018       Family History   Problem Relation Age of Onset    Stroke Mother     Stroke Father        Social History     Socioeconomic History    Marital status: Single     Spouse name: None    Number of children: None    Years of education: None    Highest education level: None   Tobacco Use    Smoking status: Some Days     Packs/day: 0.25     Years: 15.00     Pack years: 3.75     Types: Cigarettes     Last attempt to quit: 2017     Years since quittin.4    Smokeless tobacco: Never   Vaping Use    Vaping Use: Never used   Substance and Sexual Activity    Alcohol use:  Yes     Alcohol/week: 0.0 standard drinks     Comment: occassionally    Drug use: No       Current Outpatient Medications   Medication Sig Dispense Refill    Multiple Vitamin (MULTIVITAMIN ADULT PO) Take by mouth daily      phenytoin (DILANTIN) 100 MG ER capsule Take 2 po bid 360 capsule 3    ALPRAZolam [place]) 0.5 MG tablet Take 1 po 15 minutes before test . May repeat x 1 . 2 tablet 0    diphenhydrAMINE (BENADRYL) 25 MG tablet Take 25 mg by mouth daily as needed       atorvastatin (LIPITOR) 80 MG tablet Take 80 mg by mouth daily      carvedilol (COREG) 12.5 MG tablet Take 12.5 mg by mouth 2 times daily (with meals)      lisinopril (PRINIVIL;ZESTRIL) 10 MG tablet Take 10 mg by mouth 2 times daily       aspirin 325 MG tablet Take 325 mg by mouth 2 times daily (Patient not taking: Reported on 10/5/2022)       No current facility-administered medications for this visit. No Known Allergies    Review of Systems   Constitutional: Negative. HENT: Negative. Eyes: Negative. Respiratory: Negative. Cardiovascular:  Positive for leg swelling. Gastrointestinal: Negative. Endocrine: Negative. Genitourinary: Negative. Musculoskeletal: Negative. Skin: Negative. Allergic/Immunologic: Negative. Neurological: Negative. Hematological: Negative. Psychiatric/Behavioral: Negative. Objective:   Physical Exam  Vitals:    10/05/22 1332   BP: 130/70   Pulse: 60     weight: 190 lb (86.2 kg)    Neurological Examination  Constitutional .General exam well groomed   Head/Ears /Nose/Throat: external ear . Normal exam  Neck and thyroid . Normal size. No bruits  Respiratory . Breathsounds clear bilaterally  Cardiovascular: Auscultation of heart with regular rate and rhythm  Musculoskeletal. Muscle bulk and tone normal                                                           Muscle strength 5/5 strength throughout                                                                                No dysmetria or dysdiadokinesis  No tremor   Normal fine motor  Gait normal   Orientation Alert and oriented x 3   Attention and concentration normal  Short term memory normal  Language process and speech normal . No aphasia   Cranial nerve 2 normal acuety and visual fields  Cranial nerve 3, 4 and 6 . Extraocular muscles are intact . Pupils are equal and reactive   Cranial nerve 5 . Normal strength of masseter and temporalis . Intact corneal reflex. Normal facial sensation  Cranial nerve 7 normal exam   Cranial nerve 8. Grossly intact hearing   Cranial nerve 9 and 10. Symmetric palate elevation   Cranial nerve 11 , 5 out of 5 strength   Cranial Nerve 12 midline tongue . No atrophy  Sensation . Normal proprioception . Intact Vibration . Normal pinprick and light touch   Deep Tendon Reflexes normal  Plantar response flexor bilaterally    Assessment:       Diagnosis Orders   1. Brain lesion  MRI BRAIN W WO CONTRAST      2. Seizure disorder (HCC)  phenytoin (DILANTIN) 100 MG ER capsule    Phenytoin Level, Total And Free    CBC with Auto Differential    AST    ALT    ALPRAZolam (XANAX) 0.5 MG tablet      He is to remain on dilantin to undergo dilantin bloodwork and MRI of Head      Plan:      Orders Placed This Encounter   Procedures    MRI BRAIN W WO CONTRAST     Standing Status:   Future     Standing Expiration Date:   10/5/2023     Order Specific Question:   STAT Creatinine as needed:     Answer:   Yes    Phenytoin Level, Total And Free     Standing Status:   Future     Standing Expiration Date:   10/5/2023     Order Specific Question:   Dose Schedule & Time of Last Dose?      Answer:   night    CBC with Auto Differential     Standing Status:   Future     Standing Expiration Date:   10/5/2023    AST     Standing Status:   Future     Standing Expiration Date:   10/5/2023    ALT     Standing Status:   Future     Standing Expiration Date:   10/5/2023           Angela Claros MD

## 2022-10-22 ENCOUNTER — HOSPITAL ENCOUNTER (OUTPATIENT)
Dept: MRI IMAGING | Age: 72
Discharge: HOME | End: 2022-10-24
Payer: MEDICARE

## 2022-10-22 DIAGNOSIS — G93.9 BRAIN LESION: ICD-10-CM

## 2022-10-22 LAB
EGFR, POC: >60 ML/MIN/1.73M2
POC CREATININE: 0.63 MG/DL (ref 0.51–1.19)

## 2022-10-22 PROCEDURE — 2580000003 HC RX 258: Performed by: PSYCHIATRY & NEUROLOGY

## 2022-10-22 PROCEDURE — A9579 GAD-BASE MR CONTRAST NOS,1ML: HCPCS | Performed by: PSYCHIATRY & NEUROLOGY

## 2022-10-22 PROCEDURE — 70553 MRI BRAIN STEM W/O & W/DYE: CPT

## 2022-10-22 PROCEDURE — 82565 ASSAY OF CREATININE: CPT

## 2022-10-22 PROCEDURE — 6360000004 HC RX CONTRAST MEDICATION: Performed by: PSYCHIATRY & NEUROLOGY

## 2022-10-22 RX ORDER — SODIUM CHLORIDE 0.9 % (FLUSH) 0.9 %
10 SYRINGE (ML) INJECTION PRN
Status: DISCONTINUED | OUTPATIENT
Start: 2022-10-22 | End: 2022-10-25 | Stop reason: HOSPADM

## 2022-10-22 RX ADMIN — SODIUM CHLORIDE, PRESERVATIVE FREE 10 ML: 5 INJECTION INTRAVENOUS at 11:55

## 2022-10-22 RX ADMIN — GADOTERIDOL 19 ML: 279.3 INJECTION, SOLUTION INTRAVENOUS at 11:55

## 2022-11-09 DIAGNOSIS — G40.909 SEIZURE DISORDER (HCC): ICD-10-CM

## 2022-11-09 RX ORDER — PHENYTOIN SODIUM 100 MG/1
CAPSULE, EXTENDED RELEASE ORAL
Qty: 360 CAPSULE | Refills: 3 | OUTPATIENT
Start: 2022-11-09

## 2022-11-10 ENCOUNTER — TELEPHONE (OUTPATIENT)
Dept: NEUROLOGY | Age: 72
End: 2022-11-10

## 2022-11-10 DIAGNOSIS — D49.6 GLIAL NEOPLASM OF BRAIN (HCC): Primary | ICD-10-CM

## 2022-11-10 DIAGNOSIS — D32.9 MENINGIOMA (HCC): ICD-10-CM

## 2022-11-10 NOTE — TELEPHONE ENCOUNTER
Call placed to the patient and this information was given. Patient verbally stated their understanding. Mr. Kelly Barnard is agreeable to see Dr. Sohan Skinner. Patient informed that I would send the referral and their office would call him to schedule. I asked that if he wasn't contacted in a few days to please let me know. Patient verbally stated his understanding.

## 2022-11-10 NOTE — TELEPHONE ENCOUNTER
----- Message from Megan Byrd MD sent at 11/10/2022  8:24 AM EST -----  Message to nurse please let pt know MRI of Head right frontal extraxial lesion is bigger consistent with meningioma . Left temporal lesion is unchanged felt to be low grade glial tumor . Please make appointment with Dr Alek Modi .  Dx brain tumor

## 2022-12-08 ENCOUNTER — OFFICE VISIT (OUTPATIENT)
Dept: NEUROSURGERY | Age: 72
End: 2022-12-08
Payer: MEDICARE

## 2022-12-08 ENCOUNTER — TELEPHONE (OUTPATIENT)
Dept: RADIATION ONCOLOGY | Age: 72
End: 2022-12-08

## 2022-12-08 VITALS
WEIGHT: 190.6 LBS | SYSTOLIC BLOOD PRESSURE: 146 MMHG | HEIGHT: 67 IN | TEMPERATURE: 97.5 F | OXYGEN SATURATION: 97 % | BODY MASS INDEX: 29.91 KG/M2 | HEART RATE: 89 BPM | DIASTOLIC BLOOD PRESSURE: 92 MMHG

## 2022-12-08 DIAGNOSIS — D32.9 MENINGIOMA (HCC): Primary | ICD-10-CM

## 2022-12-08 DIAGNOSIS — G93.9 TEMPORAL LOBE LESION: ICD-10-CM

## 2022-12-08 PROCEDURE — G8484 FLU IMMUNIZE NO ADMIN: HCPCS | Performed by: NEUROLOGICAL SURGERY

## 2022-12-08 PROCEDURE — 3078F DIAST BP <80 MM HG: CPT | Performed by: NEUROLOGICAL SURGERY

## 2022-12-08 PROCEDURE — 3074F SYST BP LT 130 MM HG: CPT | Performed by: NEUROLOGICAL SURGERY

## 2022-12-08 PROCEDURE — 1123F ACP DISCUSS/DSCN MKR DOCD: CPT | Performed by: NEUROLOGICAL SURGERY

## 2022-12-08 PROCEDURE — 4004F PT TOBACCO SCREEN RCVD TLK: CPT | Performed by: NEUROLOGICAL SURGERY

## 2022-12-08 PROCEDURE — G8417 CALC BMI ABV UP PARAM F/U: HCPCS | Performed by: NEUROLOGICAL SURGERY

## 2022-12-08 PROCEDURE — 99204 OFFICE O/P NEW MOD 45 MIN: CPT | Performed by: NEUROLOGICAL SURGERY

## 2022-12-08 PROCEDURE — G8427 DOCREV CUR MEDS BY ELIG CLIN: HCPCS | Performed by: NEUROLOGICAL SURGERY

## 2022-12-08 PROCEDURE — 3017F COLORECTAL CA SCREEN DOC REV: CPT | Performed by: NEUROLOGICAL SURGERY

## 2022-12-08 NOTE — PROGRESS NOTES
Department of Neurosurgery                                                 New patient clinic note      Reason for Consult:  Kalpesh Barnett MD  Requesting Physician:  glial neoplasm of brain and Meningioma. Neurosurgeon: Vasyl Granado DO      History Obtained From:  patient, family member - daughter    CHIEF COMPLAINT:         Chief Complaint   Patient presents with    Other       HISTORY OF PRESENT ILLNESS:       The patient is a 67 y.o. male who presents with glial neoplasm of brain and Meningioma. Patient reports no symptoms of tumor. Patient reports hx of 2 seizures, first in 2016 and second in 2017. He says the first seizure was in his sleep and was witnessed by his girlfriend, who at first was unsure if it was a seizure or a stroke. Patient has been taking seizure medications and has not had a seizure since 2017. Patient denies headaches, nausea, weakness, or numbness. Patient admits hx of heart attack and had a stent placed 8-9 years ago. He sees a cardiologist regularly. Patient denies SOB. Patient lives alone. Patient was a m[employer] for work before retiring.     PAST MEDICAL HISTORY :       Past Medical History:        Diagnosis Date    Cataracts, bilateral     Coronary artery disease     Heart attack ([address]     Hyperlipidemia     Hypertension     Seizures ([address]        Past Surgical History:        Procedure Laterality Date    CORONARY ANGIOPLASTY WITH STENT PLACEMENT  2014    2 stents placed    JOINT REPLACEMENT Right 04/26/2017    ROTATOR CUFF REPAIR      TOTAL KNEE ARTHROPLASTY Left 05/03/2018       Social History:   Social History     Socioeconomic History    Marital status: Single     Spouse name: Not on file    Number of children: Not on file    Years of education: Not on file    Highest education level: Not on file   Occupational History    Not on file   Tobacco Use    Smoking status: Some Days     Packs/day: 0.25     Years: 15.00     Pack years: 3.75     Types: Cigarettes     Last attempt to quit: 2017     Years since quittin.7    Smokeless tobacco: Never   Vaping Use    Vaping Use: Never used   Substance and Sexual Activity    Alcohol use: Yes     Alcohol/week: 0.0 standard drinks     Comment: occassionally    Drug use: No    Sexual activity: Not on file   Other Topics Concern    Not on file   Social History Narrative    Not on file     Social Determinants of Health     Financial Resource Strain: Not on file   Food Insecurity: Not on file   Transportation Needs: Not on file   Physical Activity: Not on file   Stress: Not on file   Social Connections: Not on file   Intimate Partner Violence: Not on file   Housing Stability: Not on file       Family History:       Problem Relation Age of Onset    Stroke Mother     Stroke Father        Allergies:  Tamsulosin and Chlorhexidine    Home Medications:  Prior to Admission medications    Medication Sig Start Date End Date Taking? Authorizing Provider   Multiple Vitamin (MULTIVITAMIN ADULT PO) Take by mouth daily   Yes Historical Provider, MD   diphenhydrAMINE (BENADRYL) 25 MG tablet Take 25 mg by mouth daily as needed    Yes Historical Provider, MD   atorvastatin (LIPITOR) 80 MG tablet Take 80 mg by mouth daily   Yes Historical Provider, MD   carvedilol (COREG) 12.5 MG tablet Take 12.5 mg by mouth 2 times daily (with meals)   Yes Historical Provider, MD   lisinopril (PRINIVIL;ZESTRIL) 10 MG tablet Take 10 mg by mouth 2 times daily    Yes Historical Provider, MD   phenytoin (DILANTIN) 100 MG ER capsule Take 2 po bid 23   Rose Marie Cadena MD   aspirin 325 MG tablet Take 325 mg by mouth 2 times daily  Patient not taking: No sig reported    Historical Provider, MD       Current Medications:   No current facility-administered medications for this visit.     REVIEW OF SYSTEMS:       CONSTITUTIONAL: negative for fatigue and malaise   EYES: negative for double vision and photophobia    HEENT: negative for tinnitus and sore throat   RESPIRATORY: negative for cough, shortness of breath   CARDIOVASCULAR: negative for chest pain, palpitations   GASTROINTESTINAL: negative for nausea, vomiting   GENITOURINARY: negative for incontinence   MUSCULOSKELETAL: negative for neck or back pain   NEUROLOGICAL: negative for seizures   PSYCHIATRIC: negative for agitated     Review of systems otherwise negative.     PHYSICAL EXAM:       BP (!) 146/92 (Site: Left Upper Arm, Position: Sitting, Cuff Size: Large Adult)   Pulse 89   Temp 97.5 °F (36.4 °C) (Oral)   Ht 5' 7\" (1.702 m)   Wt 190 lb 9.6 oz (86.5 kg)   SpO2 97%   BMI 29.85 kg/m²     Gen: NAD, comfortable  HEENT: moist mucus membranes  Cardio: RRR  Pulm: chest rise symmetrically  GI: abd soft  Ext: no edema  Skin: warm    Neuro:    AOX3  CN 2-12 grossly intact  Speech articulate  Motor 5/5  Sensation symmetrical   No paul or clonus    LABS AND IMAGING:     CBC with Differential:    Lab Results   Component Value Date/Time    WBC 6.7 12/15/2017 08:43 AM    RBC 4.83 12/15/2017 08:43 AM    HGB 14.8 12/15/2017 08:43 AM    HCT 43.1 12/15/2017 08:43 AM     12/15/2017 08:43 AM    MCV 89.2 12/15/2017 08:43 AM    MCH 30.6 12/15/2017 08:43 AM    MCHC 34.3 12/15/2017 08:43 AM    RDW 12.1 12/15/2017 08:43 AM    LYMPHOPCT 14 12/15/2017 08:43 AM    MONOPCT 11 12/15/2017 08:43 AM    BASOPCT 0 12/15/2017 08:43 AM    MONOSABS 0.70 12/15/2017 08:43 AM    LYMPHSABS 0.92 12/15/2017 08:43 AM    EOSABS 0.05 12/15/2017 08:43 AM    BASOSABS <0.03 12/15/2017 08:43 AM    DIFFTYPE NOT REPORTED 12/15/2017 08:43 AM     BMP:    Lab Results   Component Value Date/Time     12/15/2017 08:43 AM    K 4.2 12/15/2017 08:43 AM     12/15/2017 08:43 AM    CO2 23 12/15/2017 08:43 AM    BUN 16 12/28/2017 09:41 AM    LABALBU 4.0 03/25/2016 06:38 AM    CREATININE 0.68 01/02/2023 10:33 AM    CALCIUM 9.7 12/15/2017 08:43 AM    GFRAA >60 12/28/2017 09:41 AM    LABGLOM >60 01/02/2023 10:33 AM    GLUCOSE 155 12/15/2017 08:43 AM       Radiology Review:    MRI brain w wo contrast  10/22/2022  Official read:  1. Interval increase in size of a homogeneously enhancing dural-based  extra-axial mass overlying the right frontal lobe, consistent with a  meningioma. There is no significant mass effect or midline shift. 2. Stable indeterminate nonenhancing lesion within the left hippocampus,  suggestive of a low-grade glioma. 3. Stable moderate chronic small vessel ischemic changes. My read:      ASSESSMENT AND PLAN:       Patient Active Problem List   Diagnosis    Seizure ([address]    Temporal lobe lesion    Altered mental status    New onset seizure ([address]    S/P angioplasty with stent    Hypertension, essential    Hyperlipidemia    [facility]       Meningioma (HCC)  -     Francisco Lara MD, Radiation Oncology, [place]  Temporal lobe lesion  -     Francisco Lara MD, Radiation Oncology, [place]     A/P:  This is a 67 y.o. male with    Diagnosis Orders   1. Meningioma [facility])  Francisco Lara MD, Radiation Oncology, [place]      2. Temporal lobe lesion  Radha Lara MD, Radiation Oncology, [place]        Seizure is most likely caused by the temporal lobe lesion, which is small but slightly increased in size, this is most likely a low-grade glioma    He is currently well controlled    Meningioma has certainly increased in size and the best treatment is surgery however patient is adamantly not considering any kind of surgical intervention      I thoroughly discussed details of diagnosis, natural histories of disease, and treatment options. We discussed surgical and non-surgical options, namely focused radiation or tumor excision. I detailed the benefits, risks, recovery period, and alternatives of this surgery. I used the imaging to depict the surgery and diagnosis to the patient. Patient wishes to proceed with radiation at this time.     I made a referral to Dr. Sylvia Hernández in radiation oncology. Follow up after radiation treatment. I showed the patient the imagings and explained the diagnosis and the various treatment options    By signing my name below, Dakotagiulia Chester, attest that this documentation has been prepared under the direction and in the presence of Sandra Forbes DO. Electronically signed: Blossom Wahl, 12/8/22       This note was created using voice recognition software. There may be inaccuracies of transcription  that are inadvertently overlooked prior to the signature. There is any questions about the transcription please contact me.

## 2022-12-13 ENCOUNTER — HOSPITAL ENCOUNTER (OUTPATIENT)
Dept: RADIATION ONCOLOGY | Age: 72
Discharge: HOME | End: 2022-12-13
Payer: MEDICARE

## 2022-12-13 VITALS
TEMPERATURE: 97.6 F | DIASTOLIC BLOOD PRESSURE: 91 MMHG | WEIGHT: 190.4 LBS | RESPIRATION RATE: 14 BRPM | OXYGEN SATURATION: 97 % | HEIGHT: 67 IN | HEART RATE: 65 BPM | SYSTOLIC BLOOD PRESSURE: 174 MMHG | BODY MASS INDEX: 29.88 KG/M2

## 2022-12-13 DIAGNOSIS — D32.9 MENINGIOMA (HCC): Primary | ICD-10-CM

## 2022-12-13 PROCEDURE — 99215 OFFICE O/P EST HI 40 MIN: CPT | Performed by: RADIOLOGY

## 2022-12-13 RX ORDER — LORAZEPAM 0.5 MG/1
0.5 TABLET ORAL DAILY PRN
Qty: 6 TABLET | Refills: 0 | Status: SHIPPED | OUTPATIENT
Start: 2022-12-13 | End: 2022-12-19

## 2022-12-13 NOTE — PROGRESS NOTES
Referring Physician: Dr. Pearson Pick:    [phone]   BP: (!) 174/91   Pulse: 65   Resp: 14   Temp: 97.6 °F (36.4 °C)   SpO2: 97%    :     Pain Assessment: None - Denies Pain          Wt Readings from Last 1 Encounters:   22 190 lb 6.4 oz (86.4 kg)        Body mass index is 29.82 kg/m². Height: 5' 7\" (170.2 cm)         Immunizations:    Influenza status:    [x]   Current   []   Patient declined    Pneumococcal status:  [x]   Current  []   Patient declined  Covid status:   [x]  Dose #1:                     [x]  Dose #2:     [x]  Booster:               []  Patient declined    Smoking Status:    [x] Smoker - PPD:   [] Nonsmoker - Quit Date:               [] Never a smoker      No chief complaint on file. Cancer Staging  No matching staging information was found for the patient. Prior Radiation Therapy? No   If yes, site treated:   Facility:                             Date:    Concurrent Chemo/radiation? No   If yes, start date:    Prior Chemotherapy? No   If yes    Facility:                             Date:    Prior Hormonal Therapy or Contraceptive Medications? No   If yes,  Medication:                                Duration:    Head and Neck Cancer? No   If yes, please remind physician to place swallow study order and speech therapy order. Pacemaker/Defibulator/ICD:  No    Do you have any musculoskeletal diseases, Lupus or arthritic conditions? No   If yes, are you currently taking Methotrexate?   []  Yes  []  No           BREAST/GYN  Patient only:     LMP:    Age at first Menses:    Para:    :    Cup size:        PROSTATE patient only :    Oral hormone replacement therapy []  Yes  []  No            Current Outpatient Medications   Medication Sig Dispense Refill    Multiple Vitamin (MULTIVITAMIN ADULT PO) Take by mouth daily      phenytoin (DILANTIN) 100 MG ER capsule Take 2 po bid 360 capsule 3    aspirin 325 MG tablet Take 325 mg by mouth 2 times daily (Patient not taking: No sig reported)      diphenhydrAMINE (BENADRYL) 25 MG tablet Take 25 mg by mouth daily as needed       atorvastatin (LIPITOR) 80 MG tablet Take 80 mg by mouth daily      carvedilol (COREG) 12.5 MG tablet Take 12.5 mg by mouth 2 times daily (with meals)      lisinopril (PRINIVIL;ZESTRIL) 10 MG tablet Take 10 mg by mouth 2 times daily        No current facility-administered medications for this encounter. Past Medical History:   Diagnosis Date    Cataracts, bilateral     Coronary artery disease     Heart [address] ([facility]     Hyperlipidemia     Hypertension     Se[facility] ([facility]        Past Surgical History:   Procedure Laterality Date    CORONARY ANGIOPLASTY WITH STENT PLACEMENT      2 stents placed    JOINT REPLACEMENT Right 2017    ROTATOR CUFF REPAIR      TOTAL KNEE ARTHROPLASTY Left 2018       Family History   Problem Relation Age of Onset    Stroke Mother     Stroke Father        Social History     Socioeconomic History    Marital status: Single     Spouse name: Not on file    Number of children: Not on file    Years of education: Not on file    Highest education level: Not on file   Occupational History    Not on file   Tobacco Use    Smoking status: Some Days     Packs/day: 0.25     Years: 15.00     Pack years: 3.75     Types: Cigarettes     Last attempt to quit: 2017     Years since quittin.6    Smokeless tobacco: Never   Vaping Use    Vaping Use: Never used   Substance and Sexual Activity    Alcohol use:  Yes     Alcohol/week: 0.0 standard drinks     Comment: occassionally    Drug use: No    Sexual activity: Not on file   Other Topics Concern    Not on file   Social History Narrative    Not on file     Social Determinants of Health     Financial Resource Strain: Not on file   Food Insecurity: Not on file   Transportation Needs: Not on file   Physical Activity: Not on file   Stress: Not on file   Social Connections: Not on file   Intimate Partner Violence: Not on file   Housing Stability: Not on file             FALLS RISK SCREEN  Instructions:  Assess the patient and enter the appropriate indicators that are present for fall risk identification. Total the numbers entered and assign a fall risk score from Table 2.  Reassess patient at a minimum every 12 weeks or with status change. Assessment   Date  12/13/2022     1. Mental Ability: confusion/cognitively impaired 0     2. Elimination Issues: incontinence, frequency 0       3. Ambulatory: use of assistive devices (walker, cane, off-loading devices),        attached to equipment (IV pole, oxygen) 0     4. Sensory Limitations: dizziness, vertigo, impaired vision 0     5. Age less than 65        0     6. Age 72 or greater 1     7. Medication: diuretics, strong analgesics, hypnotics, sedatives,        antihypertensive agents 3   8. Falls:  recent history of falls within the last 3 months (not to include slipping or        tripping) 0   TOTAL 4    If score of 4 or greater was education given? Yes       TABLE 2   Risk Score Risk Level Plan of Care   0-3 Little or  No Risk 1. Provide assistance as indicated for ambulation activities  2. Reorient confused/cognitively impaired patient  3. Call-light/bell within patient's reach  4. Chair/bed in low position, stretcher/bed with siderails up except when performing patient care activities  5. Educate patient/family/caregiver on falls prevention  6.  Reassess in 12 weeks or with any noted change in patient condition which places them at a risk for a fall   4-6 Moderate Risk 1. Provide assistance as indicated for ambulation activities  2. Reorient confused/cognitively impaired patient  3. Call-light/bell within patient's reach  4. Chair/bed in low position, stretcher/bed with siderails up except when performing patient care activities  5. Educate patient/family/caregiver on falls prevention     7 or   Higher High Risk 1. Place patient in easily observable treatment room  2. Patient attended at all times by family member or staff  3. Provide assistance as indicated for ambulation activities  4. Reorient confused/cognitively impaired patient  5. Call-light/bell within patient's reach  6. Chair/bed in low position, stretcher/bed with siderails up except when performing patient care activities  7. Educate patient/family/caregiver on falls prevention             Assessment/Plan: Patient was seen today for consultation. Here to discuss role of RT for treatment. Dr. Gill Tao updated and examined pt. Per MD pt will follow up in January with an MRI prior. Pt's case will also be discussed at tumor board.           Greta Sawyer RN 12/13/2022 8:02 AM

## 2022-12-13 NOTE — CONSULTS
[facility]            Radiation Oncology          [address] [facility], [address]        Avtar Schuler: [phone]        F: [phone]       [employer]             2022    Patient: Vikas Tucker   YOB: 1950       Dear Dr Wei Lin:    Thank you for referring Vikas Tucker to me for evaluation. Below are the relevant portions of my assessment and plan of care. If you have questions, please do not hesitate to call me. I look forward to following this patient along with you. Sincerely,    Electronically signed by Ramon Mcgowan MD on 22 at 10:53 AM EST    CC: Patient Care Team:  Keeley Edwards as PCP - General  ------------------------------------------------------------------------------------------------------------------------------------------------------------------------------------------      RADIATION ONCOLOGY NEW PATIENT VISIT:    Date of Service: 2022    Location:  [facility]   [phone]     Patient ID:   Vikas Tucker  : 1950   MRN: [id number]    CHIEF COMPLAINT: \"Brain tumor\"    DIAGNOSIS:  Right temporal lobe presumed meningioma dx 2016    HISTORY OF PRESENT ILLNESS:   Vikas Tucker is a 67 y.o. male with a history of seizures back in 2016 at which time he had an MRI done which showed a dural based lesion on  2016 which measured 2.3 cm at that time as well as a left hippocampal mass measuring 1.4 cm. It was assumed that the dural lesion was a meningioma and the hippocampal lesion was a low-grade glioma both of which patient declined surgery. Patient did have a couple seizures and was started on phenytoin which he has been taking for seizure prophylaxis.   Patient followed with neurology with surveillance imaging and MRIs have shown the meningioma lesion to increase slightly in size measuring up to 4.1 cm, without the left hippocampal lesion remained stable as of October 22, 2022. Given the growth in the meningioma patient was referred to neurosurgery for consideration of excision. Patient however is not interested in any surgery. He denies any symptoms headaches, dizziness, numbness, weakness, confusion, or weakness. He is interested potentially in radiation as its not invasive. Of note patient is claustrophobic which may affect his planning and treatment. PRIOR RADIATION HISTORY:  No prior history of radiation therapy    PACEMAKER: None    PAST MEDICAL HISTORY:  Past Medical History:   Diagnosis Date    Cataracts, bilateral     Coronary artery disease     Heart attack ([address]     Hyperlipidemia     Hypertension     Seizures ([address]        PAST SURGICAL HISTORY:  Past Surgical History:   Procedure Laterality Date    CORONARY ANGIOPLASTY WITH STENT PLACEMENT  2014    2 stents placed    JOINT REPLACEMENT Right 04/26/2017    ROTATOR CUFF REPAIR      TOTAL KNEE ARTHROPLASTY Left 05/03/2018       MEDICATIONS:    Current Outpatient Medications:     LORazepam (ATIVAN) 0.5 MG tablet, Take 1 tablet by mouth daily as needed for Anxiety for up to 6 days.  As needed for radiation treatment, Disp: 6 tablet, Rfl: 0    Multiple Vitamin (MULTIVITAMIN ADULT PO), Take by mouth daily, Disp: , Rfl:     phenytoin (DILANTIN) 100 MG ER capsule, Take 2 po bid, Disp: 360 capsule, Rfl: 3    aspirin 325 MG tablet, Take 325 mg by mouth 2 times daily (Patient not taking: No sig reported), Disp: , Rfl:     diphenhydrAMINE (BENADRYL) 25 MG tablet, Take 25 mg by mouth daily as needed , Disp: , Rfl:     atorvastatin (LIPITOR) 80 MG tablet, Take 80 mg by mouth daily, Disp: , Rfl:     carvedilol (COREG) 12.5 MG tablet, Take 12.5 mg by mouth 2 times daily (with meals), Disp: , Rfl:     lisinopril (PRINIVIL;ZESTRIL) 10 MG tablet, Take 10 mg by mouth 2 times daily , Disp: , Rfl:     ALLERGIES:   Allergies   Allergen Reactions    Tamsulosin Other (See Comments)     Causes nightmares    Chlorhexidine Rash     Patient states when using Chlorhexidine wipes felt skin was burning, states slight reddness but felt like it was burning. SOCIAL HISTORY:  Social History     Socioeconomic History    Marital status: Single     Spouse name: None    Number of children: None    Years of education: None    Highest education level: None   Tobacco Use    Smoking status: Some Days     Packs/day: 0.25     Years: 15.00     Pack years: 3.75     Types: Cigarettes     Last attempt to quit: 2017     Years since quittin.6    Smokeless tobacco: Never   Vaping Use    Vaping Use: Never used   Substance and Sexual Activity    Alcohol use: Yes     Alcohol/week: 0.0 standard drinks     Comment: occassionally    Drug use: No       FAMILY HISTORY:  Family History   Problem Relation Age of Onset    Stroke Mother     Stroke Father        REVIEW OF SYSTEMS:    GENERAL/CONSTITUTIONAL: The patient denies fever, fatigue, weakness, weight gain or weight loss. HEENT: The patient denies pain, redness, loss of vision, double or blurred vision. The patient denies ringing in the ears, loss of hearing, hoarseness, trouble swallowing, or painful swallowing. CARDIOVASCULAR: The patient denies chest pain, irregular heartbeats, sudden changes in heartbeat or palpitation. RESPIRATORY: The patient denies shortness of breath, cough, hemoptysis. GASTROINTESTINAL: The patient denies nausea, vomiting, diarrhea, constipation, blood in the stools. GENITOURINARY: The patient denies difficult urination, pain or burning with urination, blood in the urine. MUSCULOSKELETAL: The patient denies arm, buttock, thigh or calf cramps. No joint or muscle pain. SKIN: The patient denies easy bruising, skin redness, skin rash, hives. NEUROLOGIC: The patient denies headache, dizziness, fainting, muscle spasm, loss of consciousness. ENDOCRINE: The patient denies intolerance to hot or cold temperature, flushing.   HEMATOLOGIC/LYMPHATIC: The patient denies anemia, bleeding tendency or clotting tendency. ALLERGIC/IMMUNOLOGIC: The patient denies rhinitis, asthma, skin sensitivity. PYSCHOLOGIC: The patient denies any depression, anxiety, or confusion. A full 14 point review of systems was performed and assessed and found to be negative except as noted above. PHYSICAL EXAMINATION:    CHAPERONE: Family/friend/companieon Present    ECO Asymptomatic    VITAL SIGNS: BP (!) 174/91   Pulse 65   Temp 97.6 °F (36.4 °C) (Temporal)   Resp 14   Ht 5' 7\" (1.702 m)   Wt 190 lb 6.4 oz (86.4 kg)   SpO2 97%   BMI 29.82 kg/m²   GENERAL:  General appearance is that of a well-nourished, well-developed in no apparent distress. HEENT: Normocephalic, atraumatic, EOMI, moist mucosa, no erythema. NECK:  No adenopathy or a palpable thyroid mass, trachea is midline. LYMPHATICS: No cervical, supraclavicular, or axillary adenopathy. HEART:  Normal rate and regular rhythm, normal heart sounds. LUNGS:  Pulmonary effort normal. Normal breath sounds. ABDOMEN:  Soft, nontender, non distended, and no hepatosplenomegaly. EXTREMITIES:  No edema. No calf tenderness. MSK:  No spinal tenderness. Normal ROM. NEUROLOGICAL: Alert and oriented. Strength and sensation intact bilaterally. No focal deficits. PSYCH: Mood normal, behavior normal.  SKIN: No erythema, no desquamation. LABS:  WBC   Date Value Ref Range Status   12/15/2017 6.7 3.5 - 11.3 k/uL Final     Segs Absolute   Date Value Ref Range Status   12/15/2017 4.96 1.50 - 8.10 k/uL Final     Hemoglobin   Date Value Ref Range Status   12/15/2017 14.8 13.0 - 17.0 g/dL Final     Platelets   Date Value Ref Range Status   12/15/2017 254 138 - 453 k/uL Final     No results found for: , CEA  No results found for:   No results found for: PSA      IMAGING:   10/22/22 MRI Brain  Impression   1.  Interval increase in size of a homogeneously enhancing dural-based   extra-axial mass overlying the right frontal lobe, consistent with a   meningioma. There is no significant mass effect or midline shift. 2. Stable indeterminate nonenhancing lesion within the left hippocampus,   suggestive of a low-grade glioma. 3. Stable moderate chronic small vessel ischemic changes. 3/25/16 MRI Brain  Impression   IMPRESSION:  1. Approximately 1 cm region of edema signal within the anterior left of the cavernous with soft tissue swelling, but without restricted diffusion, could represent seizure induced edema. Differential diagnosis includes neoplasm and    encephalitis, unilaterality of this finding is atypical for herpes and cephalitis. Follow-up brain MRI may be performed to assess for reversibility of this lesion which would be expected with seizure induced edema. 2.  Right frontal periventricular white matter T2 hyperintensity without mass effect appears to surround a vessel, of unclear etiology. Differential diagnosis includes focus of demyelination, vasculitis, microvascular ischemic disease, or nonspecific    gliosis. Milder, more symmetric appearing bilateral periventricular white matter T2/FLAIR hyperintensity is more typical in appearance for microvascular ischemic disease. 3.  2.0 x 0.7 x 2.3 cm right frontal extra-axial lesion likely represents meningioma. Contrast-enhanced imaging may be considered for further evaluation of these brain lesions. PATHOLOGY:  None        ASSESSMENT AND PLAN:   Criselda Cordero is a 67 y.o. male with a presumed meningioma of the right temporal lobe and a left frontal lobe low-grade glioma. We reviewed with the patient and family the testing that has been done so far, including imaging and pathology results. We also reviewed their staging based on the testing that as been done and the recommendations per the NCCN guidelines.  We discussed the options of treatment, including surgery, chemotherapy, and radiation, and the rationale of why radiation therapy would be indicated for this diagnosis. We also discussed that they have the option to not pursue our recommended treatment as well. Patient is apprehensive of surgery, and therefore we did discuss the role of potential stereotactic fractionated radiation treatment. This would allow patient sufficient dose to the tumor which is fairly large, and limit his time in the mask due to his claustrophobia. We reviewed the logistics of radiation treatment planning, including a CT Simulation session, as well as 5 treatments for approximately 2 weeks. With regards to radiation to the brain, I discussed the possible short-term side effects of brain radiation which included skin irritation (causing redness, dryness, or peeling), tiredness, low blood counts (causing infection or bleeding), hair loss, headache, and nausea/vomiting. Possible long-term side effects discussed included hyperpigmentation of the skin, permanent hair loss or change in color and texture of the hair if it does regrow, damage to the normal brain resulting in decreased mentation, numbness and weakness, cataract formation and decreased pituitary function. After ample time to review the patient's questions, an informed consent was obtained. Patient will be discussed at multidisciplinary tumor board to review treatment options. We will coordinate scheduling a treatment planning appointment after discussion and review final recommendations at that time. Patient will be prescribed Ativan as well for his treatment planning and will be recommended to undergo a repeat MRI as its been few months. Patient was in agreement with my recommendations. All questions were answered to their satisfaction. Patient was advised to contact us anytime should they have any questions or concerns.          Electronically signed by Ramon Mcgowan MD on 12/13/22 at 10:53 AM EST      Drugs Prescribed:  New Prescriptions    LORAZEPAM (ATIVAN) 0.5 MG TABLET Take 1 tablet by mouth daily as needed for Anxiety for up to 6 days. As needed for radiation treatment       Orders Placed:     Orders Placed This Encounter   Procedures    MRI BRAIN W WO CONTRAST         CC:  Patient Care Team:  Meryl Barton as PCP - General         Total time spent on this case on the day of encounter is 60 minutes. This time includes combination of one or more of the following - review of necessary tests, review of pertinent medical records from the EMR, performing medically appropriate examination and evaluation, counseling and educating the patient/family/caregiver, ordering necessary medical tests, procedures etc., documenting the clinical information in the electronic medical record, care coordination, referring and communicating with other health care providers and interpretation of results independently. This note is created with the assistance of a speech recognition program.  While intending to generate a document that actually reflects the content of the visit, the document can still have some errors including those of syntax and sound a like substitutions which may escape proof reading. It such instances, actual meaning can be extrapolated by contextual diversion.

## 2022-12-19 ENCOUNTER — TELEPHONE (OUTPATIENT)
Dept: NEUROLOGY | Age: 72
End: 2022-12-19

## 2022-12-19 NOTE — TELEPHONE ENCOUNTER
I called the patient to schedule follow up. He did not want to schedule at this time. When he is ready to schedule he will call the office.   KS

## 2023-01-02 ENCOUNTER — HOSPITAL ENCOUNTER (OUTPATIENT)
Dept: MRI IMAGING | Age: 73
Discharge: HOME | End: 2023-01-04
Payer: MEDICARE

## 2023-01-02 DIAGNOSIS — D32.9 MENINGIOMA (HCC): ICD-10-CM

## 2023-01-02 LAB
CREAT SERPL-MCNC: 0.68 MG/DL (ref 0.7–1.2)
GFR SERPL CREATININE-BSD FRML MDRD: >60 ML/MIN/1.73M2

## 2023-01-02 PROCEDURE — 6360000004 HC RX CONTRAST MEDICATION: Performed by: RADIOLOGY

## 2023-01-02 PROCEDURE — A9579 GAD-BASE MR CONTRAST NOS,1ML: HCPCS | Performed by: RADIOLOGY

## 2023-01-02 PROCEDURE — 82565 ASSAY OF CREATININE: CPT

## 2023-01-02 PROCEDURE — 2580000003 HC RX 258: Performed by: RADIOLOGY

## 2023-01-02 PROCEDURE — 70553 MRI BRAIN STEM W/O & W/DYE: CPT

## 2023-01-02 RX ORDER — SODIUM CHLORIDE 0.9 % (FLUSH) 0.9 %
10 SYRINGE (ML) INJECTION ONCE
Status: COMPLETED | OUTPATIENT
Start: 2023-01-02 | End: 2023-01-02

## 2023-01-02 RX ADMIN — GADOTERIDOL 18 ML: 279.3 INJECTION, SOLUTION INTRAVENOUS at 10:27

## 2023-01-02 RX ADMIN — SODIUM CHLORIDE, PRESERVATIVE FREE 10 ML: 5 INJECTION INTRAVENOUS at 10:27

## 2023-01-09 ENCOUNTER — TELEPHONE (OUTPATIENT)
Dept: NEUROLOGY | Age: 73
End: 2023-01-09

## 2023-01-09 DIAGNOSIS — G40.909 SEIZURE DISORDER (HCC): ICD-10-CM

## 2023-01-09 NOTE — TELEPHONE ENCOUNTER
Patient called in initially for a refill request of phenytoin (Dilantin) 100 mg ER. Writer checked the patient's chart and his local pharmacy has a 1 year prescription refill available. Patient is picking up a refill at a [facility] pharmacy where the provider sent the prescription. Patient is requesting a change in his pharmacy. Remaining refills can be sent to [address] [facility]. It is cheaper for the patient to use the mail delivery pharmacy.

## 2023-01-10 NOTE — TELEPHONE ENCOUNTER
Message to nurse  Please send new dilantin script dilantin 100 mg #360 .  Take 2 po bid 3 RF to [facility]

## 2023-01-11 RX ORDER — PHENYTOIN SODIUM 100 MG/1
CAPSULE, EXTENDED RELEASE ORAL
Qty: 360 CAPSULE | Refills: 3 | Status: SHIPPED | OUTPATIENT
Start: 2023-01-11

## 2023-01-26 ENCOUNTER — HOSPITAL ENCOUNTER (OUTPATIENT)
Dept: RADIATION ONCOLOGY | Age: 73
Discharge: HOME | End: 2023-01-26
Payer: MEDICARE

## 2023-01-26 PROCEDURE — 77334 RADIATION TREATMENT AID(S): CPT | Performed by: RADIOLOGY

## 2023-01-26 NOTE — DISCHARGE INSTRUCTIONS
What to expect next! Simulation Scan      Prior to your radiation you will need a simulation CT scan. This scan is where they will precisely identify the area on your body where you will receive radiation. Positioning is extremely important, and your body will be positioned carefully. You will be in the same position during every treatment, and you will need to remain still during the treatments. Your doctor may order a mold or a mask (for head and neck treatment only) to help reproduce your treatment set up. You will also receive tattoos during this appointment. These tattoos are very important. The therapists use these tattoos to line your body up on the treatment table. Information from the CT scan is used to precisely locate the treatment fields and create a \"map\" for the physician to design the treatment. The CT scanner is specially designed to work with the other equipment in the department and is not a replacement for other diagnostic scans you may have received. After simulation, details from the procedure are forwarded to medical radiation dosimetrists and medical physicists. These professionals perform highly technical calculations that will be used to set up the treatment machine (linear accelerator). The dosimetrist and physicist work closely with your radiation oncologist to develop the treatment plan, a process that typically takes 7-10 business days. Once the planning is completed, a therapist will call you with a start date. During that call your appointment time will also be determined. Your appointment time will be at the same time each day. Head and Neck Mask                            Body Mold                        Radiation Tattoo  Daily Treatments       You will be placed on the treatment table in the same position as you were when you had your simulation scan.  Once in place, a set of X-ray films will be taken to ensure that the treatment will be delivered the same way as it was simulated. Once the films and positioning are confirmed, a treatment will be delivered. Factors that affect the total length of the treatment include the complexity of your treatment and how quickly you can be positioned properly for treatment. Treatments are usually given once a day, Monday through Friday, for a number of weeks. The number of weeks is determined by national cancer guidelines and your physician. Each treatment generally takes only 10 to 15 minutes; however, you will likely be in the department for 20- 30 minutes each day. If your doctor has recommended SBRT treatments, your schedule will be different than mentioned above. Weekly Visits    During your treatments you will have a weekly appointment with your radiation oncologist. This appointment is to evaluate how you are tolerating your treatments and to address any questions/concerns you may have. Follow Up  We will follow your progress and see you on a regular basis. The duration between appointments vary depending on your tolerance to your treatments and your doctor's discretion. We understand that you may be seeing many other physicians, but it is important for us to participate in this follow-up process so that any radiation-related problems can be addressed if needed.

## 2023-01-27 ENCOUNTER — TELEPHONE (OUTPATIENT)
Dept: ONCOLOGY | Age: 73
End: 2023-01-27

## 2023-01-27 NOTE — TELEPHONE ENCOUNTER
[address] [facility] [facility] [place] On[facility] Nutrition Note    PGSGA scoring tool reviewed with score <4. Automatic nutrition assessment consult populates from PGSGA tool for scores > 4. Will continue to follow as requested.     MIRI Aguila, RD, LD  Registered Dietitian   [facility]  [phone]

## 2023-01-31 ENCOUNTER — HOSPITAL ENCOUNTER (OUTPATIENT)
Dept: RADIATION ONCOLOGY | Age: 73
Discharge: HOME | End: 2023-01-31
Payer: MEDICARE

## 2023-01-31 PROCEDURE — 77338 DESIGN MLC DEVICE FOR IMRT: CPT | Performed by: RADIOLOGY

## 2023-01-31 PROCEDURE — 77300 RADIATION THERAPY DOSE PLAN: CPT | Performed by: RADIOLOGY

## 2023-01-31 PROCEDURE — 77301 RADIOTHERAPY DOSE PLAN IMRT: CPT | Performed by: RADIOLOGY

## 2023-02-07 ENCOUNTER — HOSPITAL ENCOUNTER (OUTPATIENT)
Dept: RADIATION ONCOLOGY | Age: 73
Discharge: HOME | End: 2023-02-07
Payer: MEDICARE

## 2023-02-07 PROCEDURE — 77373 STRTCTC BDY RAD THER TX DLVR: CPT | Performed by: RADIOLOGY

## 2023-02-08 ENCOUNTER — APPOINTMENT (OUTPATIENT)
Dept: RADIATION ONCOLOGY | Age: 73
End: 2023-02-08
Payer: MEDICARE

## 2023-02-09 ENCOUNTER — HOSPITAL ENCOUNTER (OUTPATIENT)
Dept: RADIATION ONCOLOGY | Age: 73
Discharge: HOME | End: 2023-02-09
Payer: MEDICARE

## 2023-02-09 DIAGNOSIS — C71.2 MALIGNANT NEOPLASM OF TEMPORAL LOBE (HCC): ICD-10-CM

## 2023-02-09 LAB
RAD ONC MSQ ACTUAL FRACTIONS DELIVERED: 2
RAD ONC MSQ ACTUAL SESSION DELIVERED DOSE: 500 CGRAY
RAD ONC MSQ ACTUAL TOTAL DOSE: 1000 CGRAY
RAD ONC MSQ ELAPSED DAYS: 2
RAD ONC MSQ LAST DATE: NORMAL
RAD ONC MSQ PRESCRIBED FRACTIONAL DOSE: 500 CGRAY
RAD ONC MSQ PRESCRIBED NUMBER OF FRACTIONS: 5
RAD ONC MSQ PRESCRIBED TECHNIQUE: NORMAL
RAD ONC MSQ PRESCRIBED TOTAL DOSE: 2500 CGRAY
RAD ONC MSQ START DATE: NORMAL
RAD ONC MSQ TREATMENT COURSE NUMBER: 1
RAD ONC MSQ TREATMENT SITE: NORMAL

## 2023-02-09 PROCEDURE — 77373 STRTCTC BDY RAD THER TX DLVR: CPT | Performed by: RADIOLOGY

## 2023-02-10 ENCOUNTER — APPOINTMENT (OUTPATIENT)
Dept: RADIATION ONCOLOGY | Age: 73
End: 2023-02-10
Payer: MEDICARE

## 2023-02-13 ENCOUNTER — APPOINTMENT (OUTPATIENT)
Dept: RADIATION ONCOLOGY | Age: 73
End: 2023-02-13
Payer: MEDICARE

## 2023-02-13 DIAGNOSIS — C71.2 MALIGNANT NEOPLASM OF TEMPORAL LOBE (HCC): ICD-10-CM

## 2023-02-13 LAB
RAD ONC MSQ ACTUAL FRACTIONS DELIVERED: 3
RAD ONC MSQ ACTUAL SESSION DELIVERED DOSE: 500 CGRAY
RAD ONC MSQ ACTUAL TOTAL DOSE: 1500 CGRAY
RAD ONC MSQ ELAPSED DAYS: 6
RAD ONC MSQ LAST DATE: NORMAL
RAD ONC MSQ PRESCRIBED FRACTIONAL DOSE: 500 CGRAY
RAD ONC MSQ PRESCRIBED NUMBER OF FRACTIONS: 5
RAD ONC MSQ PRESCRIBED TECHNIQUE: NORMAL
RAD ONC MSQ PRESCRIBED TOTAL DOSE: 2500 CGRAY
RAD ONC MSQ START DATE: NORMAL
RAD ONC MSQ TREATMENT COURSE NUMBER: 1
RAD ONC MSQ TREATMENT SITE: NORMAL

## 2023-02-13 PROCEDURE — 77373 STRTCTC BDY RAD THER TX DLVR: CPT | Performed by: RADIOLOGY

## 2023-02-15 ENCOUNTER — APPOINTMENT (OUTPATIENT)
Dept: RADIATION ONCOLOGY | Age: 73
End: 2023-02-15
Payer: MEDICARE

## 2023-02-15 VITALS
RESPIRATION RATE: 16 BRPM | HEART RATE: 62 BPM | DIASTOLIC BLOOD PRESSURE: 89 MMHG | TEMPERATURE: 98 F | WEIGHT: 183.4 LBS | OXYGEN SATURATION: 98 % | BODY MASS INDEX: 28.72 KG/M2 | SYSTOLIC BLOOD PRESSURE: 156 MMHG

## 2023-02-15 DIAGNOSIS — C71.2 MALIGNANT NEOPLASM OF TEMPORAL LOBE (HCC): ICD-10-CM

## 2023-02-15 DIAGNOSIS — D32.9 MENINGIOMA (HCC): Primary | ICD-10-CM

## 2023-02-15 LAB
RAD ONC MSQ ACTUAL FRACTIONS DELIVERED: 4
RAD ONC MSQ ACTUAL SESSION DELIVERED DOSE: 500 CGRAY
RAD ONC MSQ ACTUAL TOTAL DOSE: 2000 CGRAY
RAD ONC MSQ ELAPSED DAYS: 8
RAD ONC MSQ LAST DATE: NORMAL
RAD ONC MSQ PRESCRIBED FRACTIONAL DOSE: 500 CGRAY
RAD ONC MSQ PRESCRIBED NUMBER OF FRACTIONS: 5
RAD ONC MSQ PRESCRIBED TECHNIQUE: NORMAL
RAD ONC MSQ PRESCRIBED TOTAL DOSE: 2500 CGRAY
RAD ONC MSQ START DATE: NORMAL
RAD ONC MSQ TREATMENT COURSE NUMBER: 1
RAD ONC MSQ TREATMENT SITE: NORMAL

## 2023-02-15 PROCEDURE — 77373 STRTCTC BDY RAD THER TX DLVR: CPT | Performed by: RADIOLOGY

## 2023-02-15 NOTE — PROGRESS NOTES
[address]       Radiation Oncology          [address] [facility]          [place], [address]        Alison Mukesh: [phone]        F: [phone]       [email or web address]             RADIATION ONCOLOGY WEEKLY PROGRESS NOTE  Patient ID:   Rodney Dunn  : 1950   MRN: [id number]    Location:  [facility] Radiation Oncology,   [address] [facility] [place]   [phone]    DIAGNOSIS:  Right temporal lobe presumed meningioma dx 2016    TREATMENT DETAILS:  Treatment Site: R temporal mass  Actual Dose: 2000cGy  Total Planned Dose: 2500cGy  Treatment Technique: SBRT  Fraction Technique: Every other day  Therapy imaging monitoring: CBCT daily  Concurrent Chemotherapy: NA    SUBJECTIVE:   Patient seen for their weekly on treatment evaluation today. Patient is time treatments well without any acute complaints she denies any skin irritation, headaches, dizziness, or fatigue. OBJECTIVE:   CHAPERONE: Declined    ECO Asymptomatic    VITAL SIGNS: BP (!) 156/89   Pulse 62   Temp 98 °F (36.7 °C) (Temporal)   Resp 16   Wt 183 lb 6.4 oz (83.2 kg)   SpO2 98%   BMI 28.72 kg/m²   Wt Readings from Last 5 Encounters:   02/15/23 183 lb 6.4 oz (83.2 kg)   22 190 lb 6.4 oz (86.4 kg)   23 190 lb (86.2 kg)   22 190 lb 9.6 oz (86.5 kg)   10/22/22 190 lb (86.2 kg)     GENERAL:  General appearance is that of a well-nourished, well-developed in no apparent distress. LUNGS:  Pulmonary effort normal. Normal breath sounds. EXTREMITIES:  No edema. Normal ROM. NEUROLOGICAL: Alert and oriented. Strength and sensation intact bilaterally. No focal deficits. PSYCH: Mood normal, behavior normal.  SKIN: No erythema, no desquamation.     LABS:  WBC   Date Value Ref Range Status   12/15/2017 6.7 3.5 - 11.3 k/uL Final   2016 6.7 3.5 - 11.0 k/uL Final   2016 7.0 3.5 - 11.0 k/uL Final     Segs Absolute   Date Value Ref Range Status   12/15/2017 4.96 1.50 - 8.10 k/uL Final   03/24/2016 4.40 1.8 - 7.7 k/uL Final     Hemoglobin   Date Value Ref Range Status   12/15/2017 14.8 13.0 - 17.0 g/dL Final   03/25/2016 14.5 13.5 - 17.5 g/dL Final   03/24/2016 14.7 13.5 - 17.5 g/dL Final     Platelets   Date Value Ref Range Status   12/15/2017 254 138 - 453 k/uL Final   04/04/2016 262 140 - 450 k/uL Final     Comment:     [facility] [address] [facility], [address] [place] [address] [phone]   03/25/2016 208 140 - 450 k/uL Final     Creatinine   Date Value Ref Range Status   01/02/2023 0.68 (L) 0.70 - 1.20 mg/dL Final   12/28/2017 0.65 (L) 0.70 - 1.20 mg/dL Final   12/15/2017 0.66 (L) 0.70 - 1.20 mg/dL Final     POC Creatinine   Date Value Ref Range Status   10/22/2022 0.63 0.51 - 1.19 mg/dL Final     No results found for: , CEA  No results found for: PSA    MEDICATIONS:    Current Outpatient Medications:     phenytoin (DILANTIN) 100 MG ER capsule, Take 2 po bid, Disp: 360 capsule, Rfl: 3    Multiple Vitamin (MULTIVITAMIN ADULT PO), Take by mouth daily, Disp: , Rfl:     aspirin 325 MG tablet, Take 325 mg by mouth 2 times daily (Patient not taking: No sig reported), Disp: , Rfl:     diphenhydrAMINE (BENADRYL) 25 MG tablet, Take 25 mg by mouth daily as needed , Disp: , Rfl:     atorvastatin (LIPITOR) 80 MG tablet, Take 80 mg by mouth daily, Disp: , Rfl:     carvedilol (COREG) 12.5 MG tablet, Take 12.5 mg by mouth 2 times daily (with meals), Disp: , Rfl:     lisinopril (PRINIVIL;ZESTRIL) 10 MG tablet, Take 10 mg by mouth 2 times daily , Disp: , Rfl:       ASSESSMENT PLAN:   Treatment setup and plan reviewed. Port images/CBCT images reviewed. Appropriate laboratory work was reviewed. Treatment side effects and toxicities reviewed with the patient, and appropriate management was advised. Will continue radiation treatment as planned, and recommend patient contact us if they have any questions or concerns.   Patient will follow-up in 3 months with a repeat right brain after finishing treatment.     Electronically signed by Malachi Arias MD on 2/15/2023 at 1:07 PM      Drugs Prescribed:  New Prescriptions    No medications on file       Other Orders Placed:  Orders Placed This Encounter   Procedures    MRI BRAIN W WO CONTRAST

## 2023-02-15 NOTE — PROGRESS NOTES
[facility]  2/15/2023  Wt Readings from Last 3 Encounters:   02/15/23 183 lb 6.4 oz (83.2 kg)   12/13/22 190 lb 6.4 oz (86.4 kg)   01/02/23 190 lb (86.2 kg)     Body mass index is 28.72 kg/m². Treatment Area:brain    Patient was seen today for weekly visit. Comfort Alteration    Fatigue: None      CNS Alteration  Depressed Level of Consciousness:none  Orientation: time place person  Neuropathy-Motor: intact  Ataxia: Absent   Speech Impairment: No  Seizures: No  Urinary Incontinence: No  Bowel Incontinence: No  Insomnia: No    Sensory Alteration: intact    Nutritional Alteration  Anorexia: No   Nausea: No   Vomiting: No    Skin Alteration   Sensation: intact    Radiation Dermatitis:  Intact [x]     Erythema  []     Discoloration  []     Rash []     Dry desquamation  []     Moist desquamation []       Emotional  Coping: effective      Injury, potential bleeding or infection: none    Lab Results   Component Value Date    WBC 6.7 12/15/2017     12/15/2017         BP (!) 156/89   Pulse 62   Temp 98 °F (36.7 °C) (Temporal)   Resp 16   Wt 183 lb 6.4 oz (83.2 kg)   SpO2 98%   BMI 28.72 kg/m²                     Assessment/Plan: Patient was seen today for weekly visit. He arrives ambulatory with steady gait. Denies headache or visual disturbances. One treatment remaining for radiation. No radiation related complaints. Dr. Sundeep Mills notified of assessment and examined patient. Continue plan.     Brody Partida RN

## 2023-02-17 ENCOUNTER — APPOINTMENT (OUTPATIENT)
Dept: RADIATION ONCOLOGY | Age: 73
End: 2023-02-17
Payer: MEDICARE

## 2023-02-17 DIAGNOSIS — C71.2 MALIGNANT NEOPLASM OF TEMPORAL LOBE (HCC): ICD-10-CM

## 2023-02-17 LAB
RAD ONC MSQ ACTUAL FRACTIONS DELIVERED: 5
RAD ONC MSQ ACTUAL SESSION DELIVERED DOSE: 500 CGRAY
RAD ONC MSQ ACTUAL TOTAL DOSE: 2500 CGRAY
RAD ONC MSQ ELAPSED DAYS: 10
RAD ONC MSQ LAST DATE: NORMAL
RAD ONC MSQ PRESCRIBED FRACTIONAL DOSE: 500 CGRAY
RAD ONC MSQ PRESCRIBED NUMBER OF FRACTIONS: 5
RAD ONC MSQ PRESCRIBED TECHNIQUE: NORMAL
RAD ONC MSQ PRESCRIBED TOTAL DOSE: 2500 CGRAY
RAD ONC MSQ START DATE: NORMAL
RAD ONC MSQ TREATMENT COURSE NUMBER: 1
RAD ONC MSQ TREATMENT SITE: NORMAL

## 2023-02-17 PROCEDURE — 77373 STRTCTC BDY RAD THER TX DLVR: CPT | Performed by: RADIOLOGY

## 2023-05-10 ENCOUNTER — HOSPITAL ENCOUNTER (OUTPATIENT)
Dept: MRI IMAGING | Age: 73
Discharge: HOME | End: 2023-05-12
Payer: MEDICARE

## 2023-05-10 ENCOUNTER — TELEPHONE (OUTPATIENT)
Dept: RADIATION ONCOLOGY | Age: 73
End: 2023-05-10

## 2023-05-10 DIAGNOSIS — D32.9 MENINGIOMA (HCC): ICD-10-CM

## 2023-05-10 LAB
CREAT SERPL-MCNC: 0.66 MG/DL (ref 0.7–1.2)
GFR SERPL CREATININE-BSD FRML MDRD: >60 ML/MIN/1.73M2

## 2023-05-10 PROCEDURE — 70553 MRI BRAIN STEM W/O & W/DYE: CPT

## 2023-05-10 PROCEDURE — 6360000004 HC RX CONTRAST MEDICATION: Performed by: RADIOLOGY

## 2023-05-10 PROCEDURE — 36415 COLL VENOUS BLD VENIPUNCTURE: CPT

## 2023-05-10 PROCEDURE — 82565 ASSAY OF CREATININE: CPT

## 2023-05-10 PROCEDURE — A9579 GAD-BASE MR CONTRAST NOS,1ML: HCPCS | Performed by: RADIOLOGY

## 2023-05-10 RX ORDER — SODIUM CHLORIDE 0.9 % (FLUSH) 0.9 %
10 SYRINGE (ML) INJECTION PRN
Status: DISCONTINUED | OUTPATIENT
Start: 2023-05-10 | End: 2023-05-13 | Stop reason: HOSPADM

## 2023-05-10 RX ADMIN — GADOTERIDOL 17 ML: 279.3 INJECTION, SOLUTION INTRAVENOUS at 09:31

## 2023-05-10 NOTE — TELEPHONE ENCOUNTER
[facility]. Dr. Janeth Mandujano did a [id number] with [facility] and authorization was obtained for MRI brain.    Case #[id number]  Phone #[phone]

## 2023-05-18 ENCOUNTER — HOSPITAL ENCOUNTER (OUTPATIENT)
Dept: RADIATION ONCOLOGY | Age: 73
Discharge: HOME | End: 2023-05-18
Payer: MEDICARE

## 2023-05-18 VITALS
BODY MASS INDEX: 30.54 KG/M2 | SYSTOLIC BLOOD PRESSURE: 135 MMHG | HEART RATE: 70 BPM | OXYGEN SATURATION: 97 % | TEMPERATURE: 98.1 F | RESPIRATION RATE: 16 BRPM | DIASTOLIC BLOOD PRESSURE: 86 MMHG | WEIGHT: 195 LBS

## 2023-05-18 DIAGNOSIS — D32.9 MENINGIOMA (HCC): Primary | ICD-10-CM

## 2023-05-18 PROCEDURE — 99212 OFFICE O/P EST SF 10 MIN: CPT | Performed by: RADIOLOGY

## 2023-05-18 NOTE — PROGRESS NOTES
Irais Chol  5/18/2023  9:46 AM      Vitals:    05/18/23 0939   BP: 135/86   Pulse: 70   Resp: 16   Temp: 98.1 °F (36.7 °C)   SpO2: 97%    :     Pain Assessment: None - Denies Pain          Wt Readings from Last 1 Encounters:   05/18/23 195 lb (88.5 kg)                Current Outpatient Medications:     phenytoin (DILANTIN) 100 MG ER capsule, Take 2 po bid, Disp: 360 capsule, Rfl: 3    Multiple Vitamin (MULTIVITAMIN ADULT PO), Take by mouth daily, Disp: , Rfl:     aspirin 325 MG tablet, Take 325 mg by mouth 2 times daily (Patient not taking: Reported on 10/5/2022), Disp: , Rfl:     diphenhydrAMINE (BENADRYL) 25 MG tablet, Take 25 mg by mouth daily as needed , Disp: , Rfl:     atorvastatin (LIPITOR) 80 MG tablet, Take 80 mg by mouth daily, Disp: , Rfl:     carvedilol (COREG) 12.5 MG tablet, Take 12.5 mg by mouth 2 times daily (with meals), Disp: , Rfl:     lisinopril (PRINIVIL;ZESTRIL) 10 MG tablet, Take 10 mg by mouth 2 times daily  (Patient not taking: Reported on 5/18/2023), Disp: , Rfl:       FALLS RISK SCREEN  Instructions:  Assess the patient and enter the appropriate indicators that are present for fall risk identification. Total the numbers entered and assign a fall risk score from Table 2.  Reassess patient at a minimum every 12 weeks or with status change. Assessment   Date  5/18/2023     1. Mental Ability: confusion/cognitively impaired 0     2. Elimination Issues: incontinence, frequency 0       3. Ambulatory: use of assistive devices (walker, cane, off-loading devices),        attached to equipment (IV pole, oxygen) 0     4. Sensory Limitations: dizziness, vertigo, impaired vision 0     5. Age less than 65        0     6. Age 72 or greater 1     7. Medication: diuretics, strong analgesics, hypnotics, sedatives,        antihypertensive agents 1   8.   Falls:  recent history of falls within the last 3 months (not to include slipping or        tripping) 0   TOTAL 2    If score of 4 or greater

## 2023-05-18 NOTE — PROGRESS NOTES
[facility]            Radiation Oncology          [address] [facility]          [place], [address]        [place]: [phone]        F: [phone]       [email or web address]           Date of Service: 2023     Location:  Pending sale to [facility],   [address] [facility] [place]   [phone]       RADIATION ONCOLOGY FOLLOW UP NOTE    Patient ID:   Jetta Paget  : 1950   MRN: [id number]    DIAGNOSIS:  Right frontal lobe meningioma    -s/p SRT 25Gy 23    INTERVAL HISTORY:   Jetta Paget is a 67 y.o.. male with a right frontal lobe presumed meningioma who underwent a course of stereotactic radiation therapy to the lesion comes in today for a follow-up visit approximately 3 months after finishing. Overall patient has been doing well in the interim denying any acute symptoms of headaches, dizziness, vision change, numbness, weakness, confusion, or any seizure activities. He denies any symptoms of fatigue, chest pain, shortness of breath, abdominal pain, nausea, swelling, bleeding. He does have some hair loss in the treatment area but otherwise has been doing well and back to his baseline activity level. He had a MRI of the brain on May 10, 2023 which showed unchanged findings related to the meningioma mass.     MEDICATIONS:    Current Outpatient Medications:     phenytoin (DILANTIN) 100 MG ER capsule, Take 2 po bid, Disp: 360 capsule, Rfl: 3    Multiple Vitamin (MULTIVITAMIN ADULT PO), Take by mouth daily, Disp: , Rfl:     aspirin 325 MG tablet, Take 325 mg by mouth 2 times daily (Patient not taking: Reported on 10/5/2022), Disp: , Rfl:     diphenhydrAMINE (BENADRYL) 25 MG tablet, Take 25 mg by mouth daily as needed , Disp: , Rfl:     atorvastatin (LIPITOR) 80 MG tablet, Take 80 mg by mouth daily, Disp: , Rfl:     carvedilol (COREG) 12.5 MG tablet, Take 12.5 mg by mouth 2 times daily (with meals), Disp: , Rfl:     lisinopril (PRINIVIL;ZESTRIL) 10

## 2023-08-23 ENCOUNTER — TELEPHONE (OUTPATIENT)
Dept: RADIATION ONCOLOGY | Age: 73
End: 2023-08-23

## 2023-08-23 NOTE — TELEPHONE ENCOUNTER
Pt did not complete MRI on 8/18/23, therefore pts rad/onc f/u appt w/Dr. Juan Manuel Preston on 8/24/23 is cancelled. I called pt, no answer, left messg for pt to c/b to get MRI rescheduled, when completed will reschedule follow up.

## 2023-09-15 ENCOUNTER — HOSPITAL ENCOUNTER (OUTPATIENT)
Dept: MRI IMAGING | Age: 73
End: 2023-09-15
Attending: RADIOLOGY
Payer: MEDICARE

## 2023-09-15 DIAGNOSIS — D32.9 MENINGIOMA (HCC): ICD-10-CM

## 2023-09-15 LAB
CREAT SERPL-MCNC: 0.7 MG/DL (ref 0.7–1.2)
GFR SERPL CREATININE-BSD FRML MDRD: >60 ML/MIN/1.73M2

## 2023-09-15 PROCEDURE — 36415 COLL VENOUS BLD VENIPUNCTURE: CPT

## 2023-09-15 PROCEDURE — 70553 MRI BRAIN STEM W/O & W/DYE: CPT

## 2023-09-15 PROCEDURE — A9579 GAD-BASE MR CONTRAST NOS,1ML: HCPCS | Performed by: RADIOLOGY

## 2023-09-15 PROCEDURE — 2580000003 HC RX 258: Performed by: RADIOLOGY

## 2023-09-15 PROCEDURE — 6360000004 HC RX CONTRAST MEDICATION: Performed by: RADIOLOGY

## 2023-09-15 PROCEDURE — 82565 ASSAY OF CREATININE: CPT

## 2023-09-15 RX ORDER — SODIUM CHLORIDE 0.9 % (FLUSH) 0.9 %
10 SYRINGE (ML) INJECTION ONCE
Status: COMPLETED | OUTPATIENT
Start: 2023-09-15 | End: 2023-09-15

## 2023-09-15 RX ADMIN — GADOTERIDOL 18 ML: 279.3 INJECTION, SOLUTION INTRAVENOUS at 11:06

## 2023-09-15 RX ADMIN — SODIUM CHLORIDE, PRESERVATIVE FREE 10 ML: 5 INJECTION INTRAVENOUS at 11:07

## 2023-09-19 ENCOUNTER — HOSPITAL ENCOUNTER (OUTPATIENT)
Dept: RADIATION ONCOLOGY | Age: 73
Discharge: HOME | End: 2023-09-19
Payer: MEDICARE

## 2023-09-19 VITALS
SYSTOLIC BLOOD PRESSURE: 121 MMHG | TEMPERATURE: 98 F | HEART RATE: 67 BPM | DIASTOLIC BLOOD PRESSURE: 75 MMHG | RESPIRATION RATE: 16 BRPM

## 2023-09-19 DIAGNOSIS — D32.9 MENINGIOMA (HCC): Primary | ICD-10-CM

## 2023-09-19 PROCEDURE — 99214 OFFICE O/P EST MOD 30 MIN: CPT | Performed by: RADIOLOGY

## 2023-09-19 PROCEDURE — 99212 OFFICE O/P EST SF 10 MIN: CPT | Performed by: RADIOLOGY

## 2023-09-19 ASSESSMENT — PAIN SCALES - GENERAL: PAINLEVEL_OUTOF10: 0

## 2023-09-20 ENCOUNTER — TELEPHONE (OUTPATIENT)
Dept: NEUROLOGY | Age: 73
End: 2023-09-20

## 2023-09-20 DIAGNOSIS — D32.9 MENINGIOMA (HCC): ICD-10-CM

## 2023-09-20 DIAGNOSIS — G40.909 SEIZURE DISORDER (HCC): Primary | ICD-10-CM

## 2023-09-20 NOTE — TELEPHONE ENCOUNTER
Patient called in regards to his phenytoin prescription. He states that he has not experienced any seizures since he began treatment for his meningioma. Per Dr. Dean Nash office visit note on 5/18/2023, he discussed the possibility of discontinuing phenytoin. Patient called in regards to this. Please advise, thank you so much.

## 2023-09-21 NOTE — PROGRESS NOTES
[address] [facility]            Radiation Oncology          [facility], [address] [place]: [phone]        F: [phone]       [place]           Date of Service: 2023     Location:  [address],   [facility], [place]., [facility], [address] [facility]   [phone]       RADIATION ONCOLOGY FOLLOW UP NOTE    Patient ID:   Cinthia Tabares  : 1950   MRN: [id number]    DIAGNOSIS:  Right frontal lobe meningioma    -s/p SRT 25Gy 23    INTERVAL HISTORY:   Cinthia Tabares is a 67 y.o.. male with a right frontal lobe presumed meningioma who underwent a course of stereotactic radiation therapy to the lesion comes in today for a follow-up visit approximately 7 months after finishing. Overall patient has been doing well in the interim denying any acute symptoms of headaches, dizziness, vision change, numbness, weakness, confusion, or any seizure activities. He denies any symptoms of fatigue, chest pain, shortness of breath, abdominal pain, nausea, swelling, bleeding. He is back to his baseline activity level. He had a MRI of the brain on September 15, 2023 which showed unchanged findings related to the meningioma mass.     MEDICATIONS:    Current Outpatient Medications:     ASPIRIN 81 PO, Take by mouth daily, Disp: , Rfl:     phenytoin (DILANTIN) 100 MG ER capsule, Take 2 po bid, Disp: 360 capsule, Rfl: 3    Multiple Vitamin (MULTIVITAMIN ADULT PO), Take by mouth daily, Disp: , Rfl:     aspirin 325 MG tablet, Take 325 mg by mouth 2 times daily (Patient not taking: Reported on 10/5/2022), Disp: , Rfl:     diphenhydrAMINE (BENADRYL) 25 MG tablet, Take 25 mg by mouth daily as needed , Disp: , Rfl:     atorvastatin (LIPITOR) 80 MG tablet, Take 80 mg by mouth daily, Disp: , Rfl:     carvedilol (COREG) 12.5 MG tablet, Take 12.5 mg by mouth 2 times daily (with meals), Disp: , Rfl:     lisinopril (PRINIVIL;ZESTRIL) 10 MG tablet, Take 1

## 2023-09-27 NOTE — TELEPHONE ENCOUNTER
Pt called in again about this. He was wondering if he could possibly just cut down on the dose? He said that Dr Lauren Sharpe stated to him that he could come off of it since he has not had any seizures. I informed him that he has not been having any seizures because of the medication. He said that he was just doing what he was told by the other provider. I apologized for the delay in getting a response and told him that I would send another message and get back with him as soon as I could. Please advise, thanks.

## 2023-09-27 NOTE — TELEPHONE ENCOUNTER
Please let patient know he has high incidence of seizure recurrence off dilantin . He needs to continue this medication dilantin 200 mg po bid . Please make sure she has script dilantin 100 mg table #360 . Take 2 po bid 3 RF . Have him undergo free and total dilantin level with CBC , AST and ALT .  Make sure he has FU appointment

## 2023-09-28 NOTE — TELEPHONE ENCOUNTER
Pt called back in about this. I informed him that he should continue the Dilantin. I also stated that we would like to get some routine labs checked. He said that he would like to go to [facility]. I told him that we will get them faxed there. He needs to go first thing in the morning and hold his morning dose of Dilantin until after he gets his labs drawn. He stated his understanding.

## 2023-10-04 DIAGNOSIS — G40.909 SEIZURE DISORDER (HCC): ICD-10-CM

## 2023-10-04 DIAGNOSIS — D32.9 MENINGIOMA (HCC): ICD-10-CM

## 2023-10-28 DIAGNOSIS — G40.909 SEIZURE DISORDER (HCC): ICD-10-CM

## 2023-10-30 RX ORDER — PHENYTOIN SODIUM 100 MG/1
CAPSULE, EXTENDED RELEASE ORAL
Qty: 360 CAPSULE | Refills: 3 | OUTPATIENT
Start: 2023-10-30

## 2023-11-06 DIAGNOSIS — G40.909 SEIZURE DISORDER (HCC): ICD-10-CM

## 2023-11-06 NOTE — TELEPHONE ENCOUNTER
Pharmacy requesting refill of Dilantin 100 mg.       Medication active on med list yes      Date of last Rx: 1/11/2023 with 3 refills          verified by JERRELL, GADIEL      Date of last appointment 10/5/2022    Next Visit Date:  11/8/2023

## 2023-11-07 RX ORDER — PHENYTOIN SODIUM 100 MG/1
CAPSULE, EXTENDED RELEASE ORAL
Qty: 360 CAPSULE | Refills: 1 | Status: SHIPPED | OUTPATIENT
Start: 2023-11-07

## 2023-11-08 ENCOUNTER — OFFICE VISIT (OUTPATIENT)
Dept: NEUROLOGY | Age: 73
End: 2023-11-08
Payer: MEDICARE

## 2023-11-08 VITALS
HEIGHT: 67 IN | HEART RATE: 69 BPM | SYSTOLIC BLOOD PRESSURE: 123 MMHG | DIASTOLIC BLOOD PRESSURE: 80 MMHG | BODY MASS INDEX: 30.54 KG/M2

## 2023-11-08 DIAGNOSIS — G40.909 SEIZURE DISORDER (HCC): Primary | ICD-10-CM

## 2023-11-08 DIAGNOSIS — D32.9 MENINGIOMA (HCC): ICD-10-CM

## 2023-11-08 DIAGNOSIS — D49.6 GLIAL NEOPLASM OF BRAIN (HCC): ICD-10-CM

## 2023-11-08 PROCEDURE — 3074F SYST BP LT 130 MM HG: CPT | Performed by: PSYCHIATRY & NEUROLOGY

## 2023-11-08 PROCEDURE — 3079F DIAST BP 80-89 MM HG: CPT | Performed by: PSYCHIATRY & NEUROLOGY

## 2023-11-08 PROCEDURE — 99214 OFFICE O/P EST MOD 30 MIN: CPT | Performed by: PSYCHIATRY & NEUROLOGY

## 2023-11-08 PROCEDURE — 1123F ACP DISCUSS/DSCN MKR DOCD: CPT | Performed by: PSYCHIATRY & NEUROLOGY

## 2023-11-08 RX ORDER — LISINOPRIL AND HYDROCHLOROTHIAZIDE 25; 20 MG/1; MG/1
1 TABLET ORAL DAILY
COMMUNITY
Start: 2023-05-16

## 2023-11-08 ASSESSMENT — ENCOUNTER SYMPTOMS
GASTROINTESTINAL NEGATIVE: 1
ALLERGIC/IMMUNOLOGIC NEGATIVE: 1
EYES NEGATIVE: 1
RESPIRATORY NEGATIVE: 1

## 2023-11-08 NOTE — PROGRESS NOTES
Neurological Examination  Constitutional .General exam well groomed   Head/Ears /Nose/Throat: external ear . Normal exam  Neck and thyroid . Normal size. No bruits  Respiratory . Breathsounds clear bilaterally  Cardiovascular: Auscultation of heart with regular rate and rhythm  Musculoskeletal. Muscle bulk and tone normal                                                           Muscle strength 5/5 strength throughout                                                                                No dysmetria or dysdiadokinesis  No tremor   Normal fine motor  Gait normal   Orientation Alert and oriented x 3   Attention and concentration normal  Short term memory normal  Language process and speech normal . No aphasia   Cranial nerve 2 normal acuety and visual fields  Cranial nerve 3, 4 and 6 . Extraocular muscles are intact . Pupils are equal and reactive   Cranial nerve 5 . Normal strength of masseter and temporalis . Intact corneal reflex. Normal facial sensation  Cranial nerve 7 normal exam   Cranial nerve 8. Grossly intact hearing   Cranial nerve 9 and 10. Symmetric palate elevation   Cranial nerve 11 , 5 out of 5 strength   Cranial Nerve 12 midline tongue . No atrophy  Sensation . Normal proprioception . Intact Vibration . Normal pinprick and light touch   Deep Tendon Reflexes normal  Plantar response flexor bilaterally    Assessment:       Diagnosis Orders   1. Seizure disorder ([address])        2.  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)        He is to continue current regimen      Plan:      As above           Meera Boss MD

## 2024-03-28 ENCOUNTER — APPOINTMENT (OUTPATIENT)
Dept: RADIATION ONCOLOGY | Age: 74
End: 2024-03-28
Payer: MEDICARE

## 2024-04-04 ENCOUNTER — HOSPITAL ENCOUNTER (OUTPATIENT)
Dept: MRI IMAGING | Age: 74
Discharge: HOME | End: 2024-04-06
Attending: RADIOLOGY
Payer: MEDICARE

## 2024-04-04 DIAGNOSIS — D32.9 MENINGIOMA (HCC): ICD-10-CM

## 2024-04-04 LAB
CREAT SERPL-MCNC: 0.8 MG/DL (ref 0.7–1.2)
GFR SERPL CREATININE-BSD FRML MDRD: >90 ML/MIN/1.73M2

## 2024-04-04 PROCEDURE — 70553 MRI BRAIN STEM W/O & W/DYE: CPT

## 2024-04-04 PROCEDURE — 82565 ASSAY OF CREATININE: CPT

## 2024-04-04 PROCEDURE — 36415 COLL VENOUS BLD VENIPUNCTURE: CPT

## 2024-04-04 PROCEDURE — 2580000003 HC RX 258: Performed by: RADIOLOGY

## 2024-04-04 PROCEDURE — 6360000004 HC RX CONTRAST MEDICATION: Performed by: RADIOLOGY

## 2024-04-04 PROCEDURE — A9579 GAD-BASE MR CONTRAST NOS,1ML: HCPCS | Performed by: RADIOLOGY

## 2024-04-04 RX ORDER — SODIUM CHLORIDE 0.9 % (FLUSH) 0.9 %
10 SYRINGE (ML) INJECTION ONCE
Status: COMPLETED | OUTPATIENT
Start: 2024-04-04 | End: 2024-04-04

## 2024-04-04 RX ADMIN — SODIUM CHLORIDE, PRESERVATIVE FREE 10 ML: 5 INJECTION INTRAVENOUS at 07:59

## 2024-04-04 RX ADMIN — GADOTERIDOL 36 ML: 279.3 INJECTION, SOLUTION INTRAVENOUS at 07:57

## 2024-04-18 ENCOUNTER — HOSPITAL ENCOUNTER (OUTPATIENT)
Dept: RADIATION ONCOLOGY | Age: 74
Discharge: HOME | End: 2024-04-18
Payer: MEDICARE

## 2024-04-18 VITALS
WEIGHT: 198.4 LBS | HEART RATE: 59 BPM | DIASTOLIC BLOOD PRESSURE: 94 MMHG | OXYGEN SATURATION: 95 % | BODY MASS INDEX: 31.07 KG/M2 | SYSTOLIC BLOOD PRESSURE: 163 MMHG | RESPIRATION RATE: 16 BRPM | TEMPERATURE: 97.7 F

## 2024-04-18 DIAGNOSIS — D32.9 MENINGIOMA (HCC): Primary | ICD-10-CM

## 2024-04-18 PROCEDURE — 99212 OFFICE O/P EST SF 10 MIN: CPT | Performed by: RADIOLOGY

## 2024-04-18 NOTE — PROGRESS NOTES
Ankush SUAZO Jt  4/18/2024  10:13 AM      Vitals:    04/18/24 0954   BP: (!) 163/94   Pulse: 59   Resp: 16   Temp: 97.7 °F (36.5 °C)   SpO2: 95%    :     Pain Assessment: None - Denies Pain          Wt Readings from Last 1 Encounters:   04/18/24 90 kg (198 lb 6.4 oz)                Current Outpatient Medications:     lisinopril-hydroCHLOROthiazide (PRINZIDE;ZESTORETIC) 20-25 MG per tablet, Take 1 tablet by mouth daily, Disp: , Rfl:     phenytoin (DILANTIN) 100 MG ER capsule, TAKE 2 CAPSULES TWICE A DAY, Disp: 360 capsule, Rfl: 1    ASPIRIN 81 PO, Take by mouth daily, Disp: , Rfl:     Multiple Vitamin (MULTIVITAMIN ADULT PO), Take by mouth daily, Disp: , Rfl:     aspirin 325 MG tablet, Take 325 mg by mouth 2 times daily (Patient not taking: Reported on 10/5/2022), Disp: , Rfl:     diphenhydrAMINE (BENADRYL) 25 MG tablet, Take 1 tablet by mouth daily as needed, Disp: , Rfl:     atorvastatin (LIPITOR) 80 MG tablet, Take 1 tablet by mouth daily, Disp: , Rfl:     carvedilol (COREG) 12.5 MG tablet, Take 1 tablet by mouth 2 times daily (with meals), Disp: , Rfl:     lisinopril (PRINIVIL;ZESTRIL) 10 MG tablet, Take 1 tablet by mouth 2 times daily (Patient not taking: Reported on 11/8/2023), Disp: , Rfl:                FALLS RISK SCREEN  Instructions:  Assess the patient and enter the appropriate indicators that are present for fall risk identification.   Total the numbers entered and assign a fall risk score from Table 2.  Reassess patient at a minimum every 12 weeks or with status change.    Assessment   Date  4/18/2024     1.  Mental Ability: confusion/cognitively impaired 0     2.  Elimination Issues: incontinence, frequency 0       3.  Ambulatory: use of assistive devices (walker, cane, off-loading devices),        attached to equipment (IV pole, oxygen) 0     4.  Sensory Limitations: dizziness, vertigo, impaired vision 0     5.  Age less than 65        0     6.  Age 65 or greater 1     7.  Medication: diuretics, strong

## 2024-05-03 DIAGNOSIS — G40.909 SEIZURE DISORDER (HCC): ICD-10-CM

## 2024-05-03 NOTE — TELEPHONE ENCOUNTER
Pharmacy requesting refill of Dilantin 100 mg.      Medication active on med list yes      Date of last Rx: 11/7/2023 with 1 refills          verified by GADIEL ALLAN      Date of last appointment 11/8/2023    Next Visit Date:  Visit date not found

## 2024-05-04 RX ORDER — PHENYTOIN SODIUM 100 MG/1
CAPSULE, EXTENDED RELEASE ORAL
Qty: 360 CAPSULE | Refills: 1 | Status: SHIPPED | OUTPATIENT
Start: 2024-05-04

## 2024-11-25 DIAGNOSIS — G40.909 SEIZURE DISORDER (HCC): ICD-10-CM

## 2024-11-25 NOTE — TELEPHONE ENCOUNTER
Pharmacy requesting refill of phenytoin 100 mg.      Medication active on med list yes      Date of last Rx: 5/4/2024 with 1 refills          verified by GADIEL ALLAN      Date of last appointment 11/8/2023    Next Visit Date:  Visit date not found, call placed to patient to schedule. He stated that he will call back into office to schedule.

## 2024-11-26 RX ORDER — PHENYTOIN SODIUM 100 MG/1
CAPSULE, EXTENDED RELEASE ORAL
Qty: 360 CAPSULE | Refills: 0 | Status: SHIPPED | OUTPATIENT
Start: 2024-11-26

## 2024-12-20 DIAGNOSIS — G40.909 SEIZURE DISORDER (HCC): ICD-10-CM

## 2024-12-20 RX ORDER — PHENYTOIN SODIUM 100 MG/1
CAPSULE, EXTENDED RELEASE ORAL
Qty: 360 CAPSULE | Refills: 1 | Status: SHIPPED | OUTPATIENT
Start: 2024-12-20

## 2025-04-01 ENCOUNTER — OFFICE VISIT (OUTPATIENT)
Dept: NEUROLOGY | Age: 75
End: 2025-04-01
Payer: MEDICARE

## 2025-04-01 VITALS
WEIGHT: 181 LBS | HEIGHT: 66 IN | HEART RATE: 58 BPM | BODY MASS INDEX: 29.09 KG/M2 | DIASTOLIC BLOOD PRESSURE: 76 MMHG | SYSTOLIC BLOOD PRESSURE: 124 MMHG

## 2025-04-01 DIAGNOSIS — D49.6 GLIAL NEOPLASM OF BRAIN (HCC): ICD-10-CM

## 2025-04-01 DIAGNOSIS — D32.9 MENINGIOMA (HCC): ICD-10-CM

## 2025-04-01 DIAGNOSIS — G40.909 SEIZURE DISORDER (HCC): Primary | ICD-10-CM

## 2025-04-01 PROCEDURE — 99214 OFFICE O/P EST MOD 30 MIN: CPT | Performed by: PSYCHIATRY & NEUROLOGY

## 2025-04-01 PROCEDURE — 1123F ACP DISCUSS/DSCN MKR DOCD: CPT | Performed by: PSYCHIATRY & NEUROLOGY

## 2025-04-01 PROCEDURE — 3078F DIAST BP <80 MM HG: CPT | Performed by: PSYCHIATRY & NEUROLOGY

## 2025-04-01 PROCEDURE — 3074F SYST BP LT 130 MM HG: CPT | Performed by: PSYCHIATRY & NEUROLOGY

## 2025-04-01 PROCEDURE — 1159F MED LIST DOCD IN RCRD: CPT | Performed by: PSYCHIATRY & NEUROLOGY

## 2025-04-01 RX ORDER — EZETIMIBE 10 MG/1
10 TABLET ORAL DAILY
COMMUNITY
Start: 2025-03-24

## 2025-04-01 ASSESSMENT — ENCOUNTER SYMPTOMS
RESPIRATORY NEGATIVE: 1
GASTROINTESTINAL NEGATIVE: 1
ALLERGIC/IMMUNOLOGIC NEGATIVE: 1
EYES NEGATIVE: 1

## 2025-04-01 NOTE — PROGRESS NOTES
Subjective:      Patient ID: Ankush Waters is a 74 y.o. male.    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 Last seizure in December 2017. Seizure type is staring with decreased interaction to outside stimulus .There is right parietal meningioma having had marj knife . The condition is has had no seisures since last seen on dilantin 200 mg po bid tolertaing medication well . He has follow up MRI this week through Dr Ochoa .  Dr Warner neurosurgery made referral to Dr Ochoa radiation oncologist at [facility] having gotten gamma knife stereotactic radiation to right frontal lobe meningioma  .  MRI of Head meningioma adjacent to right frontal lobe 4.2 x 1.7 cm with bilateral chronic periventricular small vessel ischemia . There are no headaches with no weakness , numbness . There is no gait disturbance . There is occasional bilateral tinnitus . There are no headaches with no memory complaints .Significant medications dilantin 200 mg po bid,  lipitor 80 mg po qd , aspirin 81 mg po qd . Previously on keppra . Testing MRI of Head noncontrast with inflammation 1.4 x 1.3 x .1.1 cm posterior left hippocampus and right fontal lobe along with right parietal 2 x 0.7 x 2.3 cm meningioma, March 2016 . CTA head and neck with no large vessel occlusion . CSF analysis 0 RBC, 0 WBC, total protein 66 , glucose 68 . EEG normal , June 2016 . CSF analysis 0 RBC, 0 WBC, total protein 66 , glucose 68.FU MRI less conspicuous left temporal lesion with right frontal convexity extraxial meningioma  2.4 x 1 cm with enhancement , June 2016 .  FU MRI of Head stable appearing T2 hyperintense ill defined nonenhancing lesion in the left mesial temporal lobe with associated FLAIR and T2 signal hyperintensity involving the left hippocampus, December 2016.  MRI of Head with stable cystic appearance mass in left temporal lobe with surrounding edema . The lesion measures 1.5

## 2025-04-04 ENCOUNTER — HOSPITAL ENCOUNTER (OUTPATIENT)
Dept: MRI IMAGING | Age: 75
Discharge: HOME | End: 2025-04-04
Attending: RADIOLOGY
Payer: MEDICARE

## 2025-04-04 DIAGNOSIS — D32.9 MENINGIOMA (HCC): ICD-10-CM

## 2025-04-04 LAB
BUN SERPL-MCNC: 18 MG/DL (ref 8–23)
CREAT SERPL-MCNC: 0.7 MG/DL (ref 0.7–1.2)
GFR, ESTIMATED: >90 ML/MIN/1.73M2

## 2025-04-04 PROCEDURE — 82565 ASSAY OF CREATININE: CPT

## 2025-04-04 PROCEDURE — A9579 GAD-BASE MR CONTRAST NOS,1ML: HCPCS | Performed by: RADIOLOGY

## 2025-04-04 PROCEDURE — 84520 ASSAY OF UREA NITROGEN: CPT

## 2025-04-04 PROCEDURE — 2500000003 HC RX 250 WO HCPCS: Performed by: RADIOLOGY

## 2025-04-04 PROCEDURE — 70553 MRI BRAIN STEM W/O & W/DYE: CPT

## 2025-04-04 PROCEDURE — 6360000004 HC RX CONTRAST MEDICATION: Performed by: RADIOLOGY

## 2025-04-04 RX ORDER — SODIUM CHLORIDE 0.9 % (FLUSH) 0.9 %
10 SYRINGE (ML) INJECTION PRN
Status: ACTIVE | OUTPATIENT
Start: 2025-04-04

## 2025-04-04 RX ADMIN — GADOTERIDOL 17 ML: 279.3 INJECTION, SOLUTION INTRAVENOUS at 08:36

## 2025-04-04 RX ADMIN — SODIUM CHLORIDE, PRESERVATIVE FREE 10 ML: 5 INJECTION INTRAVENOUS at 08:37

## 2025-04-07 DIAGNOSIS — G40.909 SEIZURE DISORDER (HCC): ICD-10-CM

## 2025-04-07 LAB — PHENYTOIN FREE: 1.2 UG/ML (ref 1–2.5)

## 2025-04-08 ENCOUNTER — RESULTS FOLLOW-UP (OUTPATIENT)
Dept: NEUROLOGY | Age: 75
End: 2025-04-08

## 2025-04-14 ENCOUNTER — HOSPITAL ENCOUNTER (OUTPATIENT)
Dept: RADIATION ONCOLOGY | Age: 75
Discharge: HOME | End: 2025-04-14
Payer: MEDICARE

## 2025-04-14 VITALS
HEART RATE: 60 BPM | WEIGHT: 180.8 LBS | SYSTOLIC BLOOD PRESSURE: 159 MMHG | TEMPERATURE: 98.3 F | OXYGEN SATURATION: 97 % | BODY MASS INDEX: 29.18 KG/M2 | DIASTOLIC BLOOD PRESSURE: 81 MMHG | RESPIRATION RATE: 16 BRPM

## 2025-04-14 DIAGNOSIS — D32.9 MENINGIOMA (HCC): Primary | ICD-10-CM

## 2025-04-14 PROCEDURE — 99212 OFFICE O/P EST SF 10 MIN: CPT | Performed by: RADIOLOGY

## 2025-04-14 NOTE — PROGRESS NOTES
Ankush SUAZO Jt  4/14/2025  9:21 AM      Vitals:    04/14/25 0909   BP: (!) 159/81   Pulse: 60   Resp: 16   Temp: 98.3 °F (36.8 °C)   SpO2: 97%      :     Pain Assessment: None - Denies Pain          Wt Readings from Last 1 Encounters:   04/14/25 82 kg (180 lb 12.8 oz)                Current Outpatient Medications:     ezetimibe (ZETIA) 10 MG tablet, Take 1 tablet by mouth daily, Disp: , Rfl:     phenytoin (DILANTIN) 100 MG ER capsule, TAKE 2 CAPSULES BY MOUTH TWO TIMES A DAY, Disp: 360 capsule, Rfl: 1    lisinopril-hydroCHLOROthiazide (PRINZIDE;ZESTORETIC) 20-25 MG per tablet, Take 1 tablet by mouth daily, Disp: , Rfl:     ASPIRIN 81 PO, Take by mouth daily, Disp: , Rfl:     Multiple Vitamin (MULTIVITAMIN ADULT PO), Take by mouth daily, Disp: , Rfl:     diphenhydrAMINE (BENADRYL) 25 MG tablet, Take 1 tablet by mouth daily as needed, Disp: , Rfl:     atorvastatin (LIPITOR) 80 MG tablet, Take 1 tablet by mouth daily, Disp: , Rfl:     carvedilol (COREG) 12.5 MG tablet, Take 1 tablet by mouth 2 times daily (with meals), Disp: , Rfl:                FALLS RISK SCREEN  Instructions:  Assess the patient and enter the appropriate indicators that are present for fall risk identification.   Total the numbers entered and assign a fall risk score from Table 2.  Reassess patient at a minimum every 12 weeks or with status change.    Assessment   Date  4/14/2025     1.  Mental Ability: confusion/cognitively impaired 0     2.  Elimination Issues: incontinence, frequency 0       3.  Ambulatory: use of assistive devices (walker, cane, off-loading devices),        attached to equipment (IV pole, oxygen) 0     4.  Sensory Limitations: dizziness, vertigo, impaired vision 0     5.  Age less than 65        0     6.  Age 65 or greater 1     7.  Medication: diuretics, strong analgesics, hypnotics, sedatives,        antihypertensive agents 3   8.  Falls:  recent history of falls within the last 3 months (not to include slipping or

## 2025-04-15 NOTE — PROGRESS NOTES
[facility]            Radiation Oncology          [address] [place] [address]        O: [phone]        F: [phone]       [email or web address]           Date of Service: 2025     Location:  [facility] Radiation Oncology,   [address] [facility] [place] [id number] [phone]       RADIATION ONCOLOGY FOLLOW UP NOTE    Patient ID:   Ankush Waters  : 1950   MRN: [id number]    DIAGNOSIS:  Right frontal lobe meningioma    -s/p SRT 25Gy 23    INTERVAL HISTORY:   Ankush Waters is a 74 y.o.. male with a right frontal lobe presumed meningioma who underwent a course of stereotactic radiation therapy to the lesion comes in today for a follow-up visit approximately 2 years after finishing.  Overall patient has been doing well in the interim denying any acute symptoms of headaches, dizziness, vision change, numbness, weakness, confusion, or any seizure activities.  He is still on phenytoin and follows with neurology.  He denies any symptoms of fatigue, chest pain, shortness of breath, abdominal pain, nausea, swelling, bleeding.  He is back to his baseline activity level.  He had a MRI of the brain on 2025 which showed stable findings related to the right frontal lobe meningioma.    MEDICATIONS:    Current Outpatient Medications:     ezetimibe (ZETIA) 10 MG tablet, Take 1 tablet by mouth daily, Disp: , Rfl:     phenytoin (DILANTIN) 100 MG ER capsule, TAKE 2 CAPSULES BY MOUTH TWO TIMES A DAY, Disp: 360 capsule, Rfl: 1    lisinopril-hydroCHLOROthiazide (PRINZIDE;ZESTORETIC) 20-25 MG per tablet, Take 1 tablet by mouth daily, Disp: , Rfl:     ASPIRIN 81 PO, Take by mouth daily, Disp: , Rfl:     Multiple Vitamin (MULTIVITAMIN ADULT PO), Take by mouth daily, Disp: , Rfl:     diphenhydrAMINE (BENADRYL) 25 MG tablet, Take 1 tablet by mouth daily as needed, Disp: , Rfl:     atorvastatin (LIPITOR) 80 MG tablet, Take 1 tablet by mouth daily,

## 2025-04-17 ENCOUNTER — APPOINTMENT (OUTPATIENT)
Dept: RADIATION ONCOLOGY | Age: 75
End: 2025-04-17
Payer: MEDICARE

## 2025-04-17 NOTE — ED NOTES
Pt resting on cot respirations even and unlabored, will continue to monitor, no seizure activity noted, family at the bedside     [address] [place] [address], RN  12/15/17 1196
Pt updated on plan of care, waiting for neuro to see, pt denies needs at this time     Lavona Halsted, RN  12/15/17 1271
Waiting for dilantin result, pt family at the bedside, no seizure activity noted     Antonio Ayala, RN  12/15/17 4973
n/a

## 2025-06-22 DIAGNOSIS — G40.909 SEIZURE DISORDER (HCC): ICD-10-CM

## 2025-06-23 RX ORDER — PHENYTOIN SODIUM 100 MG/1
200 CAPSULE, EXTENDED RELEASE ORAL 2 TIMES DAILY
Qty: 360 CAPSULE | Refills: 1 | Status: SHIPPED | OUTPATIENT
Start: 2025-06-23

## 2025-06-23 NOTE — TELEPHONE ENCOUNTER
Pharmacy requesting refill of Dilantin 100mg .      Medication active on med list yes      Date of last fill: 12/20/24 #360 R-1  verified on 6/23/2025   verified by VS LPN      Date of last appointment 4/1/25    Next Visit Date:  Visit date not found